# Patient Record
Sex: FEMALE | Race: WHITE | NOT HISPANIC OR LATINO | URBAN - METROPOLITAN AREA
[De-identification: names, ages, dates, MRNs, and addresses within clinical notes are randomized per-mention and may not be internally consistent; named-entity substitution may affect disease eponyms.]

---

## 2023-10-10 PROBLEM — E78.5 HYPERLIPIDEMIA: Status: ACTIVE | Noted: 2023-10-10

## 2023-10-10 PROBLEM — M54.42 LUMBAGO WITH SCIATICA, LEFT SIDE: Status: ACTIVE | Noted: 2023-10-10

## 2023-10-10 PROBLEM — E55.9 VITAMIN D DEFICIENCY: Status: ACTIVE | Noted: 2023-10-10

## 2023-10-10 PROBLEM — G89.29 OTHER CHRONIC PAIN: Status: ACTIVE | Noted: 2023-10-10

## 2023-10-10 PROBLEM — I10 ESSENTIAL HYPERTENSION: Status: ACTIVE | Noted: 2023-10-10

## 2023-10-11 DIAGNOSIS — E78.5 HYPERLIPIDEMIA, UNSPECIFIED HYPERLIPIDEMIA TYPE: ICD-10-CM

## 2023-10-11 NOTE — TELEPHONE ENCOUNTER
Rx request received. Please send populated medications to [facility] . Last appointment date 4/3/23 .

## 2023-10-12 RX ORDER — ATORVASTATIN CALCIUM 20 MG/1
20 TABLET, FILM COATED ORAL DAILY
Qty: 90 TABLET | Refills: 1 | Status: SHIPPED | OUTPATIENT
Start: 2023-10-12 | End: 2024-06-05 | Stop reason: WASHOUT

## 2023-11-18 DIAGNOSIS — G89.29 OTHER CHRONIC PAIN: ICD-10-CM

## 2023-11-20 RX ORDER — MELOXICAM 15 MG/1
15 TABLET ORAL DAILY
Qty: 90 TABLET | Refills: 0 | Status: SHIPPED | OUTPATIENT
Start: 2023-11-20 | End: 2024-03-07 | Stop reason: SDUPTHER

## 2024-03-07 DIAGNOSIS — I10 ESSENTIAL HYPERTENSION: ICD-10-CM

## 2024-03-07 DIAGNOSIS — G89.29 OTHER CHRONIC PAIN: ICD-10-CM

## 2024-03-07 RX ORDER — LOSARTAN POTASSIUM 50 MG/1
50 TABLET ORAL DAILY
COMMUNITY
Start: 2022-12-05 | End: 2024-03-07 | Stop reason: SDUPTHER

## 2024-03-07 RX ORDER — MELOXICAM 15 MG/1
15 TABLET ORAL DAILY
Qty: 90 TABLET | Refills: 0 | Status: SHIPPED | OUTPATIENT
Start: 2024-03-07 | End: 2024-06-05

## 2024-03-07 RX ORDER — LOSARTAN POTASSIUM 50 MG/1
50 TABLET ORAL DAILY
Qty: 90 TABLET | Refills: 3 | Status: SHIPPED | OUTPATIENT
Start: 2024-03-07 | End: 2025-03-07

## 2024-05-06 DIAGNOSIS — Z76.0 MEDICATION REFILL: ICD-10-CM

## 2024-05-07 RX ORDER — SERTRALINE HYDROCHLORIDE 50 MG/1
50 TABLET, FILM COATED ORAL DAILY
Qty: 30 TABLET | Refills: 0 | Status: SHIPPED | OUTPATIENT
Start: 2024-05-07 | End: 2024-06-05

## 2024-06-05 ENCOUNTER — OFFICE VISIT (OUTPATIENT)
Dept: PRIMARY CARE | Facility: CLINIC | Age: 64
End: 2024-06-05
Payer: COMMERCIAL

## 2024-06-05 VITALS
BODY MASS INDEX: 26.99 KG/M2 | HEIGHT: 65 IN | TEMPERATURE: 96.9 F | DIASTOLIC BLOOD PRESSURE: 80 MMHG | SYSTOLIC BLOOD PRESSURE: 122 MMHG | OXYGEN SATURATION: 97 % | WEIGHT: 162 LBS | HEART RATE: 73 BPM

## 2024-06-05 DIAGNOSIS — Z76.0 MEDICATION REFILL: ICD-10-CM

## 2024-06-05 DIAGNOSIS — E55.9 VITAMIN D DEFICIENCY: ICD-10-CM

## 2024-06-05 DIAGNOSIS — Z12.31 ENCOUNTER FOR SCREENING MAMMOGRAM FOR MALIGNANT NEOPLASM OF BREAST: ICD-10-CM

## 2024-06-05 DIAGNOSIS — Z12.11 SCREENING FOR MALIGNANT NEOPLASM OF COLON: ICD-10-CM

## 2024-06-05 DIAGNOSIS — F32.A ANXIETY AND DEPRESSION: ICD-10-CM

## 2024-06-05 DIAGNOSIS — G89.29 OTHER CHRONIC PAIN: ICD-10-CM

## 2024-06-05 DIAGNOSIS — E78.5 HYPERLIPIDEMIA, UNSPECIFIED HYPERLIPIDEMIA TYPE: ICD-10-CM

## 2024-06-05 DIAGNOSIS — Z87.891 PERSONAL HISTORY OF NICOTINE DEPENDENCE: ICD-10-CM

## 2024-06-05 DIAGNOSIS — Z00.00 ANNUAL PHYSICAL EXAM: Primary | ICD-10-CM

## 2024-06-05 DIAGNOSIS — M54.41 MIDLINE LOW BACK PAIN WITH BILATERAL SCIATICA, UNSPECIFIED CHRONICITY: ICD-10-CM

## 2024-06-05 DIAGNOSIS — Z71.85 VACCINE COUNSELING: ICD-10-CM

## 2024-06-05 DIAGNOSIS — M54.42 MIDLINE LOW BACK PAIN WITH BILATERAL SCIATICA, UNSPECIFIED CHRONICITY: ICD-10-CM

## 2024-06-05 DIAGNOSIS — I10 ESSENTIAL HYPERTENSION: ICD-10-CM

## 2024-06-05 DIAGNOSIS — Z13.6 SCREENING FOR CARDIOVASCULAR CONDITION: ICD-10-CM

## 2024-06-05 DIAGNOSIS — F41.9 ANXIETY AND DEPRESSION: ICD-10-CM

## 2024-06-05 PROCEDURE — 99214 OFFICE O/P EST MOD 30 MIN: CPT | Performed by: STUDENT IN AN ORGANIZED HEALTH CARE EDUCATION/TRAINING PROGRAM

## 2024-06-05 PROCEDURE — 99396 PREV VISIT EST AGE 40-64: CPT | Performed by: STUDENT IN AN ORGANIZED HEALTH CARE EDUCATION/TRAINING PROGRAM

## 2024-06-05 PROCEDURE — 3074F SYST BP LT 130 MM HG: CPT | Performed by: STUDENT IN AN ORGANIZED HEALTH CARE EDUCATION/TRAINING PROGRAM

## 2024-06-05 PROCEDURE — 3079F DIAST BP 80-89 MM HG: CPT | Performed by: STUDENT IN AN ORGANIZED HEALTH CARE EDUCATION/TRAINING PROGRAM

## 2024-06-05 RX ORDER — MELOXICAM 15 MG/1
15 TABLET ORAL DAILY
Qty: 90 TABLET | Refills: 0 | Status: SHIPPED | OUTPATIENT
Start: 2024-06-05

## 2024-06-05 RX ORDER — SERTRALINE HYDROCHLORIDE 50 MG/1
50 TABLET, FILM COATED ORAL DAILY
Qty: 30 TABLET | Refills: 0 | Status: SHIPPED | OUTPATIENT
Start: 2024-06-05

## 2024-06-05 RX ORDER — METHYLPREDNISOLONE 4 MG/1
TABLET ORAL
Qty: 21 TABLET | Refills: 0 | Status: SHIPPED | OUTPATIENT
Start: 2024-06-05 | End: 2024-06-12

## 2024-06-05 ASSESSMENT — ANXIETY QUESTIONNAIRES
1. FEELING NERVOUS, ANXIOUS, OR ON EDGE: NOT AT ALL
4. TROUBLE RELAXING: NOT AT ALL
IF YOU CHECKED OFF ANY PROBLEMS ON THIS QUESTIONNAIRE, HOW DIFFICULT HAVE THESE PROBLEMS MADE IT FOR YOU TO DO YOUR WORK, TAKE CARE OF THINGS AT HOME, OR GET ALONG WITH OTHER PEOPLE: NOT DIFFICULT AT ALL
7. FEELING AFRAID AS IF SOMETHING AWFUL MIGHT HAPPEN: NOT AT ALL
2. NOT BEING ABLE TO STOP OR CONTROL WORRYING: NOT AT ALL
6. BECOMING EASILY ANNOYED OR IRRITABLE: NOT AT ALL
GAD7 TOTAL SCORE: 0
5. BEING SO RESTLESS THAT IT IS HARD TO SIT STILL: NOT AT ALL
3. WORRYING TOO MUCH ABOUT DIFFERENT THINGS: NOT AT ALL

## 2024-06-05 ASSESSMENT — PATIENT HEALTH QUESTIONNAIRE - PHQ9
4. FEELING TIRED OR HAVING LITTLE ENERGY: NOT AT ALL
10. IF YOU CHECKED OFF ANY PROBLEMS, HOW DIFFICULT HAVE THESE PROBLEMS MADE IT FOR YOU TO DO YOUR WORK, TAKE CARE OF THINGS AT HOME, OR GET ALONG WITH OTHER PEOPLE: NOT DIFFICULT AT ALL
SUM OF ALL RESPONSES TO PHQ9 QUESTIONS 1 AND 2: 0
SUM OF ALL RESPONSES TO PHQ QUESTIONS 1-9: 1
2. FEELING DOWN, DEPRESSED OR HOPELESS: NOT AT ALL
8. MOVING OR SPEAKING SO SLOWLY THAT OTHER PEOPLE COULD HAVE NOTICED. OR THE OPPOSITE, BEING SO FIGETY OR RESTLESS THAT YOU HAVE BEEN MOVING AROUND A LOT MORE THAN USUAL: NOT AT ALL
7. TROUBLE CONCENTRATING ON THINGS, SUCH AS READING THE NEWSPAPER OR WATCHING TELEVISION: NOT AT ALL
5. POOR APPETITE OR OVEREATING: NOT AT ALL
9. THOUGHTS THAT YOU WOULD BE BETTER OFF DEAD, OR OF HURTING YOURSELF: NOT AT ALL
1. LITTLE INTEREST OR PLEASURE IN DOING THINGS: NOT AT ALL
6. FEELING BAD ABOUT YOURSELF - OR THAT YOU ARE A FAILURE OR HAVE LET YOURSELF OR YOUR FAMILY DOWN: NOT AT ALL
3. TROUBLE FALLING OR STAYING ASLEEP OR SLEEPING TOO MUCH: SEVERAL DAYS

## 2024-06-05 ASSESSMENT — PAIN SCALES - GENERAL: PAINLEVEL: 0-NO PAIN

## 2024-06-05 NOTE — PATIENT INSTRUCTIONS
Thank you for coming to see me today.    Medrol Dosepak sent to pharmacy for back pain/sciatica.    Go to the lab for fasting blood work, get this done 2 weeks after you complete the steroid course.    Mammogram and CT lung cancer screening ordered today, call to schedule.    GI referral entered for screening colonoscopy, call to schedule.    Follow-up with me in the coming weeks for Pap smear and tetanus (Tdap) vaccination, sooner if needed.

## 2024-06-05 NOTE — PROGRESS NOTES
Subjective   Alexia Gonzáles is a 63 y.o. female who presents for med review.    HPI:      PREVENTATIVE HEALTH CARE:    Immunizations:  COVID:  DUE  TDaP:  DUE - will defer until next appointment  Pneumonia:  utd  Shingles:  Had both shots in [place]    Immunization History   Administered Date(s) Administered    Flu vaccine, quadrivalent, no egg protein, age 6 month or greater (FLUCELVAX) 09/19/2020    Influenza, injectable, quadrivalent 01/26/2023    Pneumococcal conjugate vaccine, 20-valent (PREVNAR 20) 01/26/2023       Screenings:  HIV/HCV:  negative x2 4/3/2023 - utd  Pap:  DUE - will schedule another appointment  Mammogram:  DUE  Colonoscopy:  DUE - Referral given to Dr. Voss at last physical in 1/2023.  Lung:  DUE -ordered last year but never performed.  1ppd since age 16    Hyperlipidemia:  No longer taking atorvastatin, made her constipated    Back/leg Pain  Pain down both hips and all the way down both legs.  Has used steroid in the past.  Taking meloxicam regularly    Depression:  Sertraline 50 mg daily.  Patient Health Questionnaire-9 Score: 1      Not at all Not at all    Not being able to stop or control worrying Not at all Not at all   Worrying too much about different things Not at all Not at all   Trouble relaxing Not at all Not at all   Being so restless that it is hard to sit still Not at all Not at all   Becoming easily annoyed or irritable Not at all Not at all   Feeling afraid as if something awful might happen Not at all Not at all   DOROTHEA-7 Total Score 0 0   If you checked off any problems on this questionnaire,      How difficult have these problems made it for you to do your work, take care of things at home, or get along with other people? Not difficult at all        Hypertension:  Taking losartan 50 mg daily.  No chest pain/pressure/palpitations.  Also without headache or vision changes.        ROS:    Review of systems is essentially negative for all systems except for any identified issues in  "HPI above.    Objective     /80   Pulse 73   Temp 36.1 °C (96.9 °F)   Ht 1.651 m (5' 5\")   Wt 73.5 kg (162 lb)   SpO2 97%   BMI 26.96 kg/m²      PHYSICAL EXAM    GENERAL  Well-appearing, pleasant and cooperative.  No acute distress.    HEENT  HEAD:   Normocephalic.  Atraumatic.  EYES:  PERRLA.  No scleral icterus or conjunctival injection.  EARS:  Tympanic membranes visualized bilaterally without erythema, fluid, or bulging.  NECK:  No adenopathy.  No palpable thyroid enlargement or nodules.    THROAT:  Moist oropharynx without tonsillar enlargement or exudates.    LUNGS:    Clear to auscultation bilaterally.  No wheezes, rales, rhonchi.    CARDIAC:  Regular rate and rhythm.  Normal S1S2.  No murmurs/rubs/gallops.    ABDOMEN:  Soft, non-tender, non-distended.  No hepatosplenomegaly.  Normoactive bowel sounds.    MUSCULOSKELETAL:  No gross abnormalities.   No joint swelling or erythema,.  No spinal or paraspinal tenderness to palpation.    EXTREMITIES:  No LE edema or cyanosis.      NEURO           Alert and oriented x3. No focal deficits.    PSYCH:          Affect appropriate.           Assessment/Plan   Problem List Items Addressed This Visit       Essential hypertension     Well controlled, continue losartan daily.         Hyperlipidemia     Stopped taking atorvastatin.  Agreeable to recheck lipid panel with other routine labs, consider reinitiation of treatment if needed.         Vitamin D deficiency    Relevant Orders    Vitamin D 25-Hydroxy,Total (for eval of Vitamin D levels)     Other Visit Diagnoses       Annual physical exam    -  Primary    Relevant Orders    Lipid Panel    TSH with reflex to Free T4 if abnormal    Comprehensive Metabolic Panel    CBC    Screening for cardiovascular condition        Relevant Orders    Lipid Panel    Encounter for screening mammogram for malignant neoplasm of breast        Relevant Orders    BI mammo bilateral screening    Personal history of nicotine dependence "        Relevant Orders    CT lung screening low dose    Midline low back pain with bilateral sciatica, unspecified chronicity        Relevant Medications    methylPREDNISolone (Medrol Dospak) 4 mg tablets    Vaccine counseling        Screening for malignant neoplasm of colon        Relevant Orders    Referral to Gastroenterology    Anxiety and depression        Well controlled, continue sertraline daily as prescribed.        Counseling:   Medication education:    Education: The patient is counseled regarding potential side effects of all new medications.    Understanding:  Patient expressed understanding.    Adherence:  No barriers to adherence identified.           Natasha Sigala MD

## 2024-06-06 NOTE — ASSESSMENT & PLAN NOTE
Stopped taking atorvastatin.  Agreeable to recheck lipid panel with other routine labs, consider reinitiation of treatment if needed.

## 2024-07-05 ENCOUNTER — TELEPHONE (OUTPATIENT)
Dept: PRIMARY CARE | Facility: CLINIC | Age: 64
End: 2024-07-05
Payer: COMMERCIAL

## 2024-07-05 DIAGNOSIS — M54.42 MIDLINE LOW BACK PAIN WITH BILATERAL SCIATICA, UNSPECIFIED CHRONICITY: Primary | ICD-10-CM

## 2024-07-05 DIAGNOSIS — M54.41 MIDLINE LOW BACK PAIN WITH BILATERAL SCIATICA, UNSPECIFIED CHRONICITY: Primary | ICD-10-CM

## 2024-07-05 NOTE — TELEPHONE ENCOUNTER
Pt lvm requesting a referral to neurology for her sciatic pain pt states the steroids are not helping anymore

## 2024-07-11 DIAGNOSIS — Z76.0 MEDICATION REFILL: ICD-10-CM

## 2024-07-11 RX ORDER — SERTRALINE HYDROCHLORIDE 50 MG/1
50 TABLET, FILM COATED ORAL DAILY
Qty: 90 TABLET | Refills: 1 | Status: SHIPPED | OUTPATIENT
Start: 2024-07-11

## 2024-08-23 ENCOUNTER — APPOINTMENT (OUTPATIENT)
Dept: GASTROENTEROLOGY | Facility: CLINIC | Age: 64
End: 2024-08-23
Payer: COMMERCIAL

## 2024-08-27 ENCOUNTER — OFFICE VISIT (OUTPATIENT)
Dept: NEUROLOGY | Facility: CLINIC | Age: 64
End: 2024-08-27
Payer: COMMERCIAL

## 2024-08-27 VITALS — BODY MASS INDEX: 26.82 KG/M2 | WEIGHT: 161 LBS | HEIGHT: 65 IN

## 2024-08-27 DIAGNOSIS — M54.42 MIDLINE LOW BACK PAIN WITH BILATERAL SCIATICA, UNSPECIFIED CHRONICITY: ICD-10-CM

## 2024-08-27 DIAGNOSIS — M54.16 LUMBAR RADICULOPATHY: Primary | ICD-10-CM

## 2024-08-27 DIAGNOSIS — M54.41 MIDLINE LOW BACK PAIN WITH BILATERAL SCIATICA, UNSPECIFIED CHRONICITY: ICD-10-CM

## 2024-08-27 RX ORDER — GABAPENTIN 300 MG/1
300 CAPSULE ORAL 3 TIMES DAILY
Qty: 90 CAPSULE | Refills: 11 | Status: SHIPPED | OUTPATIENT
Start: 2024-08-27 | End: 2025-08-27

## 2024-08-27 NOTE — PATIENT INSTRUCTIONS
Call [phone] to schedule your MRI.      We will call to schedule your EMG test.       You can reach us at our office phone: [phone].     Eugene Coates MD  Neurology and Neuromuscular Medicine   [facility]  [facility]   [address]  [address] [place] [address]

## 2024-08-27 NOTE — PROGRESS NOTES
Neurology/Neuromuscular Consult     Alexia Gonzáles, MRN: [id number], : 1960  Reason for Referral: Sciatica   referring Provider: Natasha Sigala MD  Primary Care Physician: Natasha Sigala MD     History of Present Illness:    Ms. Gonzáles is a 64 y.o. female who presents for evaluation of sciatica.     For a year she has lower buttock pain shooting into calves.  Her symptoms are worse on the right compared to the left. They are occurring on a daily basis.  She is able to sit which relieves her symptoms.  Prolonged movement or walking makes her symptoms worse.  Tried medrol packs, PT, and chiro which made it worse.      There is a lot of crampy pain in the legs constant.     No numbness and tinlging in the feet.     Walking hunched forward feels better.    No bladder or bowel problems.     Weight gain 20 lb in 2 years.       Past Medical/Surgical History Reviewed:  HTN, HLD    Relevant past medical, surgical, family, and social histories, along with ROS was reviewed and pertinent details noted above.     Past Medical History:   Diagnosis Date    Depression      No past surgical history on file.  Family History   Problem Relation Name Age of Onset    Blood clot Mother      Cancer Father       Social History     Tobacco Use    Smoking status: Every Day     Current packs/day: 1.00     Types: Cigarettes    Smokeless tobacco: Never   Substance Use Topics    Alcohol use: Not on file      No Known Allergies     Medications:    Current Outpatient Medications:     losartan (Cozaar) 50 mg tablet, Take 1 tablet (50 mg) by mouth once daily., Disp: 90 tablet, Rfl: 3    sertraline (Zoloft) 50 mg tablet, TAKE ONE TABLET BY MOUTH EVERY DAY, Disp: 90 tablet, Rfl: 1    gabapentin (Neurontin) 300 mg capsule, Take 1 capsule (300 mg) by mouth 3 times a day., Disp: 90 capsule, Rfl: 11    meloxicam (Mobic) 15 mg tablet, TAKE ONE TABLET BY MOUTH ONCE DAILY (Patient not taking: Reported on 2024), Disp: 90 tablet, Rfl: 0  "      Physical Exam:   Ht 1.651 m (5' 5\")   Wt 73 kg (161 lb)   BMI 26.79 kg/m²      Neurological Exam:  General: NAD, cooperative   Neuro:  Awake alert, language normal, no dysarthria    Visual fields full  EOM full range  Smile sym  Tongue midline   Strength 5/5 throughout   Tone and bulk normal   Reflexes:      R          L  BR:               2          2  Biceps:         2          2  Triceps:        2          2  Knee:           2          2  Ankle:          0          0  Toes down  Sensory normal  Gait: normal base and stride, tandem and Romberg normal    Straight leg raise is negative bilaterally  There is slight discomfort to Jac's test bilaterally.    Results:     The following results, labs, and/or imaging were personally reviewed and are remarkable for:    The results below are reports from [facility] imaging:    MRI of the pelvis with and without contrast done at the [facility] on 9/2023 reported \"BENIGN FIBROOSSEUS LESION ON THE ILIAC SIDE OF THE RIGHT SI JOINT   CORRESPONDING WITH THE CT ABNORMALITY.  A SECOND SIMILAR BENIGN LESION IS   PRESENT IN THE LEFT INTERTROCHANTERIC FEMUR, BOTH UNCHANGED FROM THE   COMPARISON CT\"       CT Lumbar Spine 7/18/2023:  No acute fracture or acute traumatic subluxation of the     Multilevel discogenic degenerative changes and posterior facet   degenerative changes present involving the mid and lower lumbar.     Diffuse disc bulge and ligamentum flavum hypertrophy present at L3-4   causing moderate central canal stenosis and bilateral neural foraminal   narrowing.     Diffuse broad-based disc bulge at L4-5 with posterior facet degenerative   change cause severe central canal stenosis and multilevel neural   foraminal narrowing.     CT Pelvis   Nonspecific groundglass sclerotic density associated with the right ilium   medially on 7:115. This could be further evaluated/characterized with MRI   of the pelvis.       Impression/Plan:   Alexia Gonzáles " presents with typical right slightly worse than left radicular symptoms, with some features of neuro claudication.  Her symptoms are constant and she has severe back pain.  Exam is fairly unremarkable.  Will workup with EMG and MRI of lumbar spine.  Gabapentin as ordered for symptom relief.    Plan:  Problem List Items Addressed This Visit       Lumbar radiculopathy - Primary    Relevant Medications    gabapentin (Neurontin) 300 mg capsule up to TID     Other Relevant Orders    EMG & nerve conduction    MR lumbar spine wo IV contrast    Referral to Physical Therapy     Eugene Coates MD  Neurology and Neuromuscular Medicine   [facility]  [facility] [place]          The total appointment time today was 45 minutes. This time included preparing to see the patient, obtaining the history, performing a medically necessary appropriate physical examination, counseling and educating the patient/family, ordering tests, referring and communicating with other providers, independently interpreting results  to the patient/family and documenting clinical information in the medical record.

## 2024-09-13 ENCOUNTER — PROCEDURE VISIT (OUTPATIENT)
Dept: PRIMARY CARE | Facility: CLINIC | Age: 64
End: 2024-09-13
Payer: COMMERCIAL

## 2024-09-13 ENCOUNTER — HOSPITAL ENCOUNTER (OUTPATIENT)
Dept: RADIOLOGY | Facility: HOSPITAL | Age: 64
Discharge: HOME | End: 2024-09-13
Payer: COMMERCIAL

## 2024-09-13 VITALS
OXYGEN SATURATION: 97 % | TEMPERATURE: 96.9 F | HEART RATE: 72 BPM | BODY MASS INDEX: 27.82 KG/M2 | SYSTOLIC BLOOD PRESSURE: 120 MMHG | HEIGHT: 65 IN | WEIGHT: 167 LBS | DIASTOLIC BLOOD PRESSURE: 82 MMHG

## 2024-09-13 DIAGNOSIS — I10 ESSENTIAL HYPERTENSION: ICD-10-CM

## 2024-09-13 DIAGNOSIS — M54.16 LUMBAR RADICULOPATHY: ICD-10-CM

## 2024-09-13 DIAGNOSIS — Z12.4 CERVICAL CANCER SCREENING: Primary | ICD-10-CM

## 2024-09-13 DIAGNOSIS — Z28.21 INFLUENZA VACCINATION DECLINED BY PATIENT: ICD-10-CM

## 2024-09-13 PROCEDURE — 99214 OFFICE O/P EST MOD 30 MIN: CPT | Performed by: STUDENT IN AN ORGANIZED HEALTH CARE EDUCATION/TRAINING PROGRAM

## 2024-09-13 PROCEDURE — 72148 MRI LUMBAR SPINE W/O DYE: CPT

## 2024-09-13 ASSESSMENT — PATIENT HEALTH QUESTIONNAIRE - PHQ9
1. LITTLE INTEREST OR PLEASURE IN DOING THINGS: NOT AT ALL
2. FEELING DOWN, DEPRESSED OR HOPELESS: NOT AT ALL
SUM OF ALL RESPONSES TO PHQ9 QUESTIONS 1 AND 2: 0

## 2024-09-13 ASSESSMENT — PAIN SCALES - GENERAL: PAINLEVEL: 0-NO PAIN

## 2024-09-13 NOTE — PATIENT INSTRUCTIONS
Thank you for coming to see me today.    We will call you with results of Pap smear performed in clinic today.    Routine follow-up/medication review in 6 months, sooner if needed.

## 2024-09-13 NOTE — PROGRESS NOTES
"Subjective   Alexia Gonzáles is a 64 y.o. female who presents for pap.    HPI:      This is a 64-year-old female presenting for Pap smear.  Last seen by me for yearly physical on 6/5/2024.    Cervical Cancer Screening:  Has been several years  Menopause at age 50. No bleeding/spotting.      Hypertension:  Managed with losartan 50 mg daily.  Patient reports compliance with all blood pressure medications as prescribed.  Denies symptoms of chest pain/pressure/palpitations.  Also without headache or vision changes.      ROS:   Review of systems is essentially negative for all systems except for any identified issues in HPI above.    Objective     /82   Pulse 72   Temp 36.1 °C (96.9 °F)   Ht 1.651 m (5' 5\")   Wt 75.8 kg (167 lb)   SpO2 97%   BMI 27.79 kg/m²      PHYSICAL EXAM    GENERAL  Well-appearing, pleasant and cooperative.  No acute distress.    HEENT  HEAD:   Normocephalic.  Atraumatic.  EYES:  PERRLA.  No scleral icterus or conjunctival injection.  EARS:  Tympanic membranes visualized bilaterally without erythema, fluid, or bulging.  NECK:  No adenopathy.  No palpable thyroid enlargement or nodules.    THROAT:  Moist oropharynx without tonsillar enlargement or exudates.    LUNGS:    Clear to auscultation bilaterally.  No wheezes, rales, rhonchi.    CARDIAC:  Regular rate and rhythm.  Normal S1S2.  No murmurs/rubs/gallops.    ABDOMEN:  Soft, non-tender, non-distended.  No hepatosplenomegaly.  Normoactive bowel sounds.    :  Normal external genitalia.  Moist vaginal mucosa.  Cervix visualized without lesions, no significant discharge from external cervical os.  Bimanual exam with mobile uterus, no adenexal masses palpable bilaterally.    MUSCULOSKELETAL:  No gross abnormalities.   No joint swelling or erythema,.  No spinal or paraspinal tenderness to palpation.    EXTREMITIES:  No LE edema or cyanosis.      NEURO           Alert and oriented x3. No focal deficits.    PSYCH:          Affect " appropriate.           Assessment/Plan   Problem List Items Addressed This Visit       Essential hypertension     Well controlled, asymptomatic.  Continue losartan at current dose.          Other Visit Diagnoses       Cervical cancer screening    -  Primary    Relevant Orders    THINPREP PAP TEST    Influenza vaccination declined by patient                   Natasha Sigala MD

## 2024-09-16 DIAGNOSIS — M48.062 SPINAL STENOSIS OF LUMBAR REGION WITH NEUROGENIC CLAUDICATION: Primary | ICD-10-CM

## 2024-09-16 NOTE — PROGRESS NOTES
I called to review her MRI of the L spine which shows significant severe spinal stenosis with compression of the nerve roots. Her symptoms have been progressively worsening for a year. We will pursue pain medicine for injections for pain, and a referral to spine surgery to see if she is a surgical candidate.  I told her to make the appointment with me for an EMG to further localize.

## 2024-09-25 ENCOUNTER — APPOINTMENT (OUTPATIENT)
Dept: PHYSICAL THERAPY | Facility: CLINIC | Age: 64
End: 2024-09-25
Payer: COMMERCIAL

## 2024-09-30 ENCOUNTER — OFFICE VISIT (OUTPATIENT)
Dept: PAIN MEDICINE | Facility: CLINIC | Age: 64
End: 2024-09-30
Payer: COMMERCIAL

## 2024-09-30 ENCOUNTER — PREP FOR PROCEDURE (OUTPATIENT)
Dept: PAIN MEDICINE | Facility: CLINIC | Age: 64
End: 2024-09-30
Payer: COMMERCIAL

## 2024-09-30 VITALS
DIASTOLIC BLOOD PRESSURE: 78 MMHG | WEIGHT: 160 LBS | HEART RATE: 68 BPM | RESPIRATION RATE: 22 BRPM | BODY MASS INDEX: 26.66 KG/M2 | OXYGEN SATURATION: 98 % | HEIGHT: 65 IN | SYSTOLIC BLOOD PRESSURE: 132 MMHG

## 2024-09-30 DIAGNOSIS — M54.16 LUMBAR RADICULOPATHY: Primary | ICD-10-CM

## 2024-09-30 DIAGNOSIS — M48.062 SPINAL STENOSIS OF LUMBAR REGION WITH NEUROGENIC CLAUDICATION: ICD-10-CM

## 2024-09-30 DIAGNOSIS — M48.062 LUMBAR STENOSIS WITH NEUROGENIC CLAUDICATION: ICD-10-CM

## 2024-09-30 DIAGNOSIS — M54.12 CERVICAL RADICULOPATHY: ICD-10-CM

## 2024-09-30 PROCEDURE — 3078F DIAST BP <80 MM HG: CPT | Performed by: ANESTHESIOLOGY

## 2024-09-30 PROCEDURE — 99406 BEHAV CHNG SMOKING 3-10 MIN: CPT | Performed by: ANESTHESIOLOGY

## 2024-09-30 PROCEDURE — 99214 OFFICE O/P EST MOD 30 MIN: CPT | Performed by: ANESTHESIOLOGY

## 2024-09-30 PROCEDURE — 3008F BODY MASS INDEX DOCD: CPT | Performed by: ANESTHESIOLOGY

## 2024-09-30 PROCEDURE — 3075F SYST BP GE 130 - 139MM HG: CPT | Performed by: ANESTHESIOLOGY

## 2024-09-30 PROCEDURE — 99204 OFFICE O/P NEW MOD 45 MIN: CPT | Performed by: ANESTHESIOLOGY

## 2024-09-30 ASSESSMENT — PAIN SCALES - GENERAL
PAINLEVEL_OUTOF10: 7
PAINLEVEL: 7

## 2024-09-30 ASSESSMENT — PATIENT HEALTH QUESTIONNAIRE - PHQ9
2. FEELING DOWN, DEPRESSED OR HOPELESS: NOT AT ALL
SUM OF ALL RESPONSES TO PHQ9 QUESTIONS 1 & 2: 0
1. LITTLE INTEREST OR PLEASURE IN DOING THINGS: NOT AT ALL

## 2024-09-30 ASSESSMENT — ENCOUNTER SYMPTOMS
BACK PAIN: 1
ACTIVITY CHANGE: 1

## 2024-09-30 ASSESSMENT — PAIN - FUNCTIONAL ASSESSMENT: PAIN_FUNCTIONAL_ASSESSMENT: 0-10

## 2024-09-30 ASSESSMENT — LIFESTYLE VARIABLES
HOW OFTEN DO YOU HAVE A DRINK CONTAINING ALCOHOL: MONTHLY OR LESS
HOW OFTEN DO YOU HAVE SIX OR MORE DRINKS ON ONE OCCASION: NEVER
AUDIT-C TOTAL SCORE: 1
HOW MANY STANDARD DRINKS CONTAINING ALCOHOL DO YOU HAVE ON A TYPICAL DAY: 1 OR 2
SKIP TO QUESTIONS 9-10: 1

## 2024-09-30 ASSESSMENT — PAIN DESCRIPTION - DESCRIPTORS: DESCRIPTORS: ACHING;CRAMPING;SHARP

## 2024-09-30 NOTE — PROGRESS NOTES
The patient is a 64-year-old female with low back and bilateral leg pain.  The pain is constant but worse when she is standing and walking.  She gets some relief with rest.  She has had this pain for approximately 1 year.  The pain is worsening.  Her quality of life is unacceptable.  The pain radiates down the posterolateral aspect of both legs to a point below the knees.  She has participated in physical therapy.  She has taken over-the-counter analgesics.  We reviewed the MRI of the lumbar spine which shows multilevel degenerative changes with severe stenosis at the L4-L5 level.  The patient is scheduled to see Dr. Gallegos in November.    Review of Systems   Constitutional:  Positive for activity change.   Musculoskeletal:  Positive for back pain and gait problem.   All other systems reviewed and are negative.    GENERAL: alert and appropriate, in mild acute distress, well-hydrated, well nourished, interactive         SKIN: no rash noted         HEAD: normocephalic, no abnormality or lesion noted         EYES: no injection and visual acuity is grossly normal         EARS: external ears normal, no mastoid tenderness         NOSE: external nose normal without rhinorrhea         OROPHARYNX: moist mucus membranes, no tonsillar hypertrophy/exudate, uvula midline and pharynx non-erythematous, lips, teeth and gums are without obvious lesion         NECK: Adequate ROM, no cervical LNs noted         RESPIRATORY: breathing non-labored and no grunting/flaring/retractions         CHEST: equal chest rise with normal respiratory effort         ABDOMEN: soft and non-tender         BACK: back normal in appearance, lumbar spine with reduced ROM         EXTREMITIES: strength intact         NEUROLOGIC: gait antalgic, SLR positive, sensation grossly intact    Assessment and Plan    -Chronicity--chronic spinal pain    -Diagnostics--we reviewed the lumbar spine MRI    -Pharmacologic--no change    -Psychologic--no need for psychologic  intervention from my standpoint.  There are no mental health issues of which I am aware that are contributing to the patient's pain.  There are no substance abuse or alcohol abuse issues of which I am aware that are contributing to the patient's pain.    -Physical--we discussed the importance of physical therapy and exercise.  We discussed avoidance and modification techniques.    -Intervention--I encouraged the patient to see Dr. Gallegos next month as scheduled.  The patient and I discussed the utility of an epidural steroid injection.  I placed an order for a bilateral L5 transforaminal epidural steroid injection in the event that the patient would like to pursue this therapy.    I spent time educating the patient on the condition including the treatment and the prognosis.  I invited the patient to call at anytime with any questions.    The patient smokes 1 ppd. I encouraged and counseled on smoking cessation as this may increase systemic inflammatory factors which may contribute to patient's chronic pain in addition to smoking as generalized health detriments.

## 2024-10-17 ENCOUNTER — HOSPITAL ENCOUNTER (OUTPATIENT)
Dept: RADIOLOGY | Facility: HOSPITAL | Age: 64
Discharge: HOME | End: 2024-10-17
Payer: COMMERCIAL

## 2024-10-17 DIAGNOSIS — M54.12 CERVICAL RADICULOPATHY: ICD-10-CM

## 2024-10-17 PROCEDURE — 72141 MRI NECK SPINE W/O DYE: CPT | Performed by: RADIOLOGY

## 2024-10-17 PROCEDURE — 72141 MRI NECK SPINE W/O DYE: CPT

## 2024-11-20 ENCOUNTER — APPOINTMENT (OUTPATIENT)
Dept: ORTHOPEDIC SURGERY | Facility: CLINIC | Age: 64
End: 2024-11-20
Payer: COMMERCIAL

## 2024-11-20 ENCOUNTER — HOSPITAL ENCOUNTER (OUTPATIENT)
Dept: RADIOLOGY | Facility: CLINIC | Age: 64
Discharge: HOME | End: 2024-11-20
Payer: COMMERCIAL

## 2024-11-20 DIAGNOSIS — M54.16 LUMBAR RADICULOPATHY: ICD-10-CM

## 2024-11-20 DIAGNOSIS — M48.062 SPINAL STENOSIS OF LUMBAR REGION WITH NEUROGENIC CLAUDICATION: ICD-10-CM

## 2024-11-20 PROCEDURE — 72110 X-RAY EXAM L-2 SPINE 4/>VWS: CPT | Performed by: RADIOLOGY

## 2024-11-20 PROCEDURE — 72110 X-RAY EXAM L-2 SPINE 4/>VWS: CPT

## 2024-11-20 PROCEDURE — 99204 OFFICE O/P NEW MOD 45 MIN: CPT | Performed by: ORTHOPAEDIC SURGERY

## 2024-11-20 PROCEDURE — 99214 OFFICE O/P EST MOD 30 MIN: CPT | Performed by: ORTHOPAEDIC SURGERY

## 2024-11-20 ASSESSMENT — PAIN - FUNCTIONAL ASSESSMENT: PAIN_FUNCTIONAL_ASSESSMENT: 0-10

## 2024-11-20 NOTE — LETTER
November 22, 2024     Natasha Sigala MD  [address] [facility]  [facility] [address]    Patient: Alexia Gonzáles   YOB: 1960   Date of Visit: 11/20/2024       Dear Dr. Natasha Sigala MD:    Thank you for referring Alexia Gonzáles to me for evaluation. Below are my notes for this consultation.  If you have questions, please do not hesitate to call me. I look forward to following your patient along with you.       Sincerely,     Kwaku Gallegos MD      CC: Eugene Coates MD  ______________________________________________________________________________________    This pleasant 64-year-old woman is referred by Dr. Eugene Coates.    She has developed rather severe back and bilateral leg pain with ambulation.  Her symptoms are consistent with neurogenic claudication.    Her ability to ambulate has become quite compromised.    She has been through a physical therapy program.    Family, social, and medical histories are obtained and reviewed.    30-point, patient-recorded Review of Systems is personally obtained and reviewed. Inclusive is no history of weight loss, change in appetite, recent change in activity level, change in bowel or bladder habits, fevers, chills, malaise, or night pain.    On exam, middle-age woman no acute distress.  Kyphotic posture.  Slow deliberate gait.  Her strength is intact in both upper extremities without pathologic reflexes.    She does have weakness in ankle dorsiflexion bilateral, 3/5 on the left, 4/5 on the right.  No hyperreflexia.    Painless motion both hips.    Her cervical MRI shows mild spondylitic changes without spinal cord compression.    Plain films today of the lumbar spine show degenerative spondylolisthesis of L4 on 5.    Her MRI shows extremely severe stenosis at L3-4 and L4-5.    Impression: This woman has become disabled with severe lumbar radiculopathy and claudication.  She has great difficulty with ambulation.   She has objective and subjective weakness.    She is a candidate for surgery given the severity of her symptoms, her weakness, and the severity of her neural compression on imaging.  Surgery in her case would be a posterior decompression and fusion L3-5 with a L4-5 TLIF.  We have discussed the risks and benefits and expectations of surgery.    Further physical therapy I think would be of limited benefit and epidural injections would be very unlikely to help her, given the severity of her stenosis.    I have encouraged her to think about things and she will let us know if she would like to proceed.    ** Dictated with voice recognition software and not immediately reviewed for errors in grammar and/or spelling **

## 2024-11-21 ENCOUNTER — TELEPHONE (OUTPATIENT)
Dept: NEUROLOGY | Facility: CLINIC | Age: 64
End: 2024-11-21
Payer: COMMERCIAL

## 2024-11-21 NOTE — TELEPHONE ENCOUNTER
Spoke with Dr. Coates and he declined the extra refill of Gabapentin at this time.  Earliest fill date will be 11/25/24 per [facility].

## 2024-11-22 NOTE — PROGRESS NOTES
This pleasant 64-year-old woman is referred by Dr. Eugene Coates.    She has developed rather severe back and bilateral leg pain with ambulation.  Her symptoms are consistent with neurogenic claudication.    Her ability to ambulate has become quite compromised.    She has been through a physical therapy program.    Family, social, and medical histories are obtained and reviewed.    30-point, patient-recorded Review of Systems is personally obtained and reviewed. Inclusive is no history of weight loss, change in appetite, recent change in activity level, change in bowel or bladder habits, fevers, chills, malaise, or night pain.    On exam, middle-age woman no acute distress.  Kyphotic posture.  Slow deliberate gait.  Her strength is intact in both upper extremities without pathologic reflexes.    She does have weakness in ankle dorsiflexion bilateral, 3/5 on the left, 4/5 on the right.  No hyperreflexia.    Painless motion both hips.    Her cervical MRI shows mild spondylitic changes without spinal cord compression.    Plain films today of the lumbar spine show degenerative spondylolisthesis of L4 on 5.    Her MRI shows extremely severe stenosis at L3-4 and L4-5.    Impression: This woman has become disabled with severe lumbar radiculopathy and claudication.  She has great difficulty with ambulation.  She has objective and subjective weakness.    She is a candidate for surgery given the severity of her symptoms, her weakness, and the severity of her neural compression on imaging.  Surgery in her case would be a posterior decompression and fusion L3-5 with a L4-5 TLIF.  We have discussed the risks and benefits and expectations of surgery.    Further physical therapy I think would be of limited benefit and epidural injections would be very unlikely to help her, given the severity of her stenosis.    I have encouraged her to think about things and she will let us know if she would like to proceed.    ** Dictated with  voice recognition software and not immediately reviewed for errors in grammar and/or spelling **

## 2025-01-04 ENCOUNTER — APPOINTMENT (OUTPATIENT)
Dept: RADIOLOGY | Facility: HOSPITAL | Age: 65
End: 2025-01-04
Payer: COMMERCIAL

## 2025-01-04 ENCOUNTER — HOSPITAL ENCOUNTER (EMERGENCY)
Facility: HOSPITAL | Age: 65
Discharge: HOME | End: 2025-01-04
Attending: EMERGENCY MEDICINE
Payer: COMMERCIAL

## 2025-01-04 VITALS
RESPIRATION RATE: 16 BRPM | OXYGEN SATURATION: 97 % | BODY MASS INDEX: 26.66 KG/M2 | DIASTOLIC BLOOD PRESSURE: 72 MMHG | TEMPERATURE: 96.8 F | SYSTOLIC BLOOD PRESSURE: 152 MMHG | HEART RATE: 62 BPM | HEIGHT: 65 IN | WEIGHT: 160 LBS

## 2025-01-04 DIAGNOSIS — T14.8XXA CONTUSION OF SOFT TISSUE: ICD-10-CM

## 2025-01-04 DIAGNOSIS — R10.9 FLANK PAIN: ICD-10-CM
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
## 2025-01-04 PROCEDURE — 99284 EMERGENCY DEPT VISIT MOD MDM: CPT | Mod: 25 | Performed by: EMERGENCY MEDICINE

## 2025-01-04 PROCEDURE — 74176 CT ABD & PELVIS W/O CONTRAST: CPT | Performed by: STUDENT IN AN ORGANIZED HEALTH CARE EDUCATION/TRAINING PROGRAM

## 2025-01-04 PROCEDURE — 74176 CT ABD & PELVIS W/O CONTRAST: CPT

## 2025-01-04 PROCEDURE — 2500000004 HC RX 250 GENERAL PHARMACY W/ HCPCS (ALT 636 FOR OP/ED): Performed by: EMERGENCY MEDICINE

## 2025-01-04 PROCEDURE — 96372 THER/PROPH/DIAG INJ SC/IM: CPT | Performed by: EMERGENCY MEDICINE

## 2025-01-04 RX ORDER — FENTANYL CITRATE 50 UG/ML
100 INJECTION, SOLUTION INTRAMUSCULAR; INTRAVENOUS ONCE
Status: COMPLETED | OUTPATIENT
Start: 2025-01-04 | End: 2025-01-04

## 2025-01-04 RX ORDER — ONDANSETRON 4 MG/1
4 TABLET, ORALLY DISINTEGRATING ORAL ONCE
Status: COMPLETED | OUTPATIENT
Start: 2025-01-04 | End: 2025-01-04

## 2025-01-04 RX ORDER — MORPHINE SULFATE 4 MG/ML
4 INJECTION, SOLUTION INTRAMUSCULAR; INTRAVENOUS ONCE
Status: COMPLETED | OUTPATIENT
Start: 2025-01-04 | End: 2025-01-04

## 2025-01-04 RX ORDER — OXYCODONE AND ACETAMINOPHEN 5; 325 MG/1; MG/1
1 TABLET ORAL EVERY 6 HOURS PRN
Qty: 10 TABLET | Refills: 0 | Status: SHIPPED | OUTPATIENT
Start: 2025-01-04 | End: 2025-01-08

## 2025-01-04 RX ORDER — OXYCODONE AND ACETAMINOPHEN 5; 325 MG/1; MG/1
1 TABLET ORAL EVERY 6 HOURS PRN
Qty: 10 TABLET | Refills: 0 | Status: SHIPPED | OUTPATIENT
Start: 2025-01-04 | End: 2025-01-04

## 2025-01-04 RX ADMIN — FENTANYL CITRATE 100 MCG: 50 INJECTION INTRAMUSCULAR; INTRAVENOUS at 21:48

## 2025-01-04 RX ADMIN — ONDANSETRON 4 MG: 4 TABLET, ORALLY DISINTEGRATING ORAL at 19:35

## 2025-01-04 RX ADMIN — MORPHINE SULFATE 4 MG: 4 INJECTION, SOLUTION INTRAMUSCULAR; INTRAVENOUS at 19:34

## 2025-01-04 ASSESSMENT — COLUMBIA-SUICIDE SEVERITY RATING SCALE - C-SSRS
1. IN THE PAST MONTH, HAVE YOU WISHED YOU WERE DEAD OR WISHED YOU COULD GO TO SLEEP AND NOT WAKE UP?: NO
6. HAVE YOU EVER DONE ANYTHING, STARTED TO DO ANYTHING, OR PREPARED TO DO ANYTHING TO END YOUR LIFE?: NO
2. HAVE YOU ACTUALLY HAD ANY THOUGHTS OF KILLING YOURSELF?: NO

## 2025-01-04 ASSESSMENT — PAIN - FUNCTIONAL ASSESSMENT
PAIN_FUNCTIONAL_ASSESSMENT: 0-10
PAIN_FUNCTIONAL_ASSESSMENT: 0-10

## 2025-01-04 ASSESSMENT — PAIN SCALES - GENERAL
PAINLEVEL_OUTOF10: 10 - WORST POSSIBLE PAIN

## 2025-01-04 ASSESSMENT — LIFESTYLE VARIABLES
HAVE YOU EVER FELT YOU SHOULD CUT DOWN ON YOUR DRINKING: NO
TOTAL SCORE: 0
EVER FELT BAD OR GUILTY ABOUT YOUR DRINKING: NO
HAVE PEOPLE ANNOYED YOU BY CRITICIZING YOUR DRINKING: NO
EVER HAD A DRINK FIRST THING IN THE MORNING TO STEADY YOUR NERVES TO GET RID OF A HANGOVER: NO

## 2025-01-04 ASSESSMENT — PAIN DESCRIPTION - LOCATION
LOCATION: ABDOMEN

## 2025-01-04 ASSESSMENT — PAIN DESCRIPTION - ORIENTATION
ORIENTATION: RIGHT
ORIENTATION: RIGHT

## 2025-01-04 ASSESSMENT — PAIN DESCRIPTION - PAIN TYPE
TYPE: ACUTE PAIN
TYPE: ACUTE PAIN

## 2025-01-04 ASSESSMENT — PAIN DESCRIPTION - DIRECTION: RADIATING_TOWARDS: FLANK

## 2025-01-04 NOTE — LETTER
January 5, 2025    Patient: Alexia Gonzáles   YOB: 1960   Date of Visit: 1/4/2025       To Whom It May Concern:    Alexia Gonzáles was seen and treated in our emergency department on 1/4/2025. She may return to work on 1/7/25..    If you have any questions or concerns, please don't hesitate to call.              CC: No Recipients

## 2025-01-04 NOTE — LETTER
January 5, 2025    Patient: Alexia Gonzáles   YOB: 1960   Date of Visit: 1/4/2025       To Whom It May Concern:    Alexia Gonzáles was seen and treated in our emergency department on 1/4/2025. She may return to work on 1/7/25. .    If you have any questions or concerns, please don't hesitate to call.              CC: No Recipients

## 2025-01-05 NOTE — ED PROVIDER NOTES
HPI   Chief Complaint   Patient presents with   • Fall     Pt c/o slipping on a wet floor, pt denies LOC or hitting her head, Pt c/o right flank pain       64-year-old female presented emergency department with a chief complaint pain in her right flank.  Ba after mechanical slip and fall on a wet floor today.  She did not hit her head no loss of consciousness.  She is able to bear weight.  Taken nothing for relief.  Denies chest pain shortness of breath numbness weakness.  Denies any other injury or trauma.  Denies anticoagulant.  No other complaint.              Patient History   Past Medical History:   Diagnosis Date   • Back pain    • Depression      History reviewed. No pertinent surgical history.  Family History   Problem Relation Name Age of Onset   • Blood clot Mother     • Cancer Father       Social History     Tobacco Use   • Smoking status: Every Day     Current packs/day: 1.00     Types: Cigarettes   • Smokeless tobacco: Never   Vaping Use   • Vaping status: Never Used   Substance Use Topics   • Alcohol use: Not Currently     Alcohol/week: 1.0 standard drink of alcohol     Types: 1 Glasses of wine per week     Comment: Ocassionly   • Drug use: Yes     Types: Marijuana     Comment: Occasionally.  Last use 09/28/2027.       Physical Exam   ED Triage Vitals   Temperature Heart Rate Respirations BP   01/04/25 1920 01/04/25 1920 01/04/25 1920 01/04/25 1921   36 °C (96.8 °F) 61 18 135/82      Pulse Ox Temp Source Heart Rate Source Patient Position   01/04/25 1920 01/04/25 1920 -- --   98 % Temporal        BP Location FiO2 (%)     -- --             Physical Exam  Vitals and nursing note reviewed.   Constitutional:       Appearance: Normal appearance.   HENT:      Head: Normocephalic.      Nose: Nose normal.      Mouth/Throat:      Mouth: Mucous membranes are moist.   Cardiovascular:      Rate and Rhythm: Normal rate.   Pulmonary:      Effort: Pulmonary effort is normal.   Abdominal:      General: Abdomen is  flat.   Musculoskeletal:      Cervical back: Normal range of motion.      Comments: Tenderness right flank region, soft no rebound or guarding no crepitus not freely movable   Skin:     General: Skin is warm.   Neurological:      General: No focal deficit present.      Mental Status: She is alert and oriented to person, place, and time.   Psychiatric:         Mood and Affect: Mood normal.           ED Course & MDM   Diagnoses as of 01/04/25 2104   Fall, initial encounter   Flank pain                 No data recorded     Jaguar Coma Scale Score: 15 (01/04/25 1921 : Ludmila Mccartney RN)                           Medical Decision Making  I have seen and evaluated this patient.  The attending physician has also seen and evaluated this patient.  Vital signs, laboratory testing and diagnostic images if applicable have been reviewed.  All laboratory and imaging is interpreted by myself unless otherwise stated.  Radiology studies are also formally interpreted by radiologist.    Anticipate discharge if no acute injury or abdominal abnormality    Labs Reviewed - No data to display  CT abdomen pelvis wo IV contrast    (Results Pending)  Medications  morphine injection 4 mg (4 mg intramuscular Given 1/4/25 1934)  ondansetron ODT (Zofran-ODT) disintegrating tablet 4 mg (4 mg oral Given 1/4/25 1935)  New Prescriptions  No medications on file            Procedure  Procedures     Braeden Banks PA-C  01/04/25 2104

## 2025-01-05 NOTE — DISCHARGE INSTRUCTIONS
Thank you for choosing [facility]. It was my pleasure to be involved in your care today.         As of today's visit, based on reasonable likelihood, that it is safe for you to be discharged back to your residence to follow-up as an outpatient for ongoing management of your medical problem. You should follow-up with any referrals / primary provider as soon as possible. The contacts (number, addresses) are listed below.         Important:  Even though we think it is safe for you to go home, there is always a small chance that we are missing something that could require hospitalization.  Therefore it is very important that if you get worse or develops any new symptoms that you return here as soon as possible to be re-evaluated.  This includes return of symptoms that have resolved such as fainting, chest pain, or symptoms that could be warning signs for stroke important:  Even though we think it is safe for you to go home, there is always a small chance that we are missing something that could require hospitalization.  Therefore it is very important that if you get worse or develops any new symptoms that you return here as soon as possible to be re-evaluated.  This includes return of symptoms that have resolved such as fainting, chest pain, or symptoms that could be warning signs for stroke         Make sure your pharmacy and primary doctor is aware of any new medications prescribed today.          It is your responsibility to contact as soon as possible, and follow through with, any referrals you were given today. We do recommend you inform them you are a [facility] follow-up patient, as often they can better accommodate your need to be seen, provided their schedules allow. We will, and have, made every effort to ensure you have access to adequate follow-up specialists available.          All problems may not be able to be fixed in one ER visit. This is why timely ongoing care is important, and this  is a responsibility you share in. Further, you are free to follow up with any provider you choose, and this is not limited to our suggestion.          If cultures were obtained today, you will be contacted should anything result that would require further treatment. Please contact the ED at the number provided with questions.          Having trouble affording medications? Try [email or web address]! (This is not a hospital endorsed website, merely a recommendation based on my own personal experiences with [employer])

## 2025-01-06 ENCOUNTER — OFFICE VISIT (OUTPATIENT)
Dept: PRIMARY CARE | Facility: CLINIC | Age: 65
End: 2025-01-06
Payer: COMMERCIAL

## 2025-01-06 PROBLEM — M48.062 SPINAL STENOSIS, LUMBAR REGION WITH NEUROGENIC CLAUDICATION: Status: ACTIVE | Noted: 2025-01-06

## 2025-01-06 PROCEDURE — 99214 OFFICE O/P EST MOD 30 MIN: CPT | Performed by: NURSE PRACTITIONER

## 2025-01-06 ASSESSMENT — ENCOUNTER SYMPTOMS
CARDIOVASCULAR NEGATIVE: 1
RESPIRATORY NEGATIVE: 1
MUSCULOSKELETAL NEGATIVE: 1
GASTROINTESTINAL NEGATIVE: 1
CONSTITUTIONAL NEGATIVE: 1

## 2025-01-06 NOTE — PROGRESS NOTES
"Chief Complaint  Alexia Gonzáles is a 64 y.o. female presenting for \"No chief complaint on file..\"    HPI     Alexia Gonzáles is a 64 y.o. female presenting for       Past Medical History  Patient Active Problem List    Diagnosis Date Noted    Fall 01/04/2025    Flank pain 01/04/2025    Contusion of soft tissue 01/04/2025    Lumbar radiculopathy 08/27/2024    Essential hypertension 10/10/2023    Hyperlipidemia 10/10/2023    Lumbago with sciatica, left side 10/10/2023    Other chronic pain 10/10/2023    Vitamin D deficiency 10/10/2023        Medications  Current Outpatient Medications   Medication Instructions    gabapentin (NEURONTIN) 300 mg, oral, 3 times daily    losartan (COZAAR) 50 mg, oral, Daily    oxyCODONE-acetaminophen (Percocet) 5-325 mg tablet 1 tablet, oral, Every 6 hours PRN    sertraline (ZOLOFT) 50 mg, oral, Daily        Surgical History  She has no past surgical history on file.     Social History  She reports that she has been smoking cigarettes. She has never used smokeless tobacco. She reports that she does not currently use alcohol after a past usage of about 1.0 standard drink of alcohol per week. She reports current drug use. Drug: Marijuana.    Family History  Family History   Problem Relation Name Age of Onset    Blood clot Mother      Cancer Father          Allergies  Patient has no known allergies.    ROS  Review of Systems   Constitutional: Negative.    Respiratory: Negative.     Cardiovascular: Negative.    Gastrointestinal: Negative.    Genitourinary: Negative.    Musculoskeletal: Negative.         Last Recorded Vitals  There were no vitals taken for this visit.    Physical Exam  Vitals and nursing note reviewed.   Constitutional:       Appearance: Normal appearance.   Cardiovascular:      Rate and Rhythm: Normal rate and regular rhythm.      Pulses: Normal pulses.      Heart sounds: Normal heart sounds.   Pulmonary:      Effort: Pulmonary effort is normal.      Breath sounds: Normal " breath sounds.   Skin:     Comments: Yellow across forehead, and left palm   Neurological:      Mental Status: She is alert.         Relevant Results      Assessment/Plan   Diagnoses and all orders for this visit:  Yellow skin (Primary)  -     Cancel: Hepatic function panel; Future  -     Lipase; Future  -     Comprehensive metabolic panel; Future  Neck swelling  -     TSH with reflex to Free T4 if abnormal; Future          COUNSELING      Medication education:              Education:  The patient is counseled regarding potential side-effects of any and all new medications             Understanding:  Patient expressed understanding             Adherence:  No barriers to adherence identified        Trupti Callahan, APRN-CNP

## 2025-01-08 ENCOUNTER — CLINICAL SUPPORT (OUTPATIENT)
Dept: PREADMISSION TESTING | Facility: HOSPITAL | Age: 65
End: 2025-01-08
Payer: COMMERCIAL

## 2025-01-08 ENCOUNTER — OFFICE VISIT (OUTPATIENT)
Dept: PRIMARY CARE | Facility: CLINIC | Age: 65
End: 2025-01-08
Payer: COMMERCIAL

## 2025-01-08 VITALS
WEIGHT: 160 LBS | TEMPERATURE: 97 F | SYSTOLIC BLOOD PRESSURE: 124 MMHG | HEIGHT: 65 IN | DIASTOLIC BLOOD PRESSURE: 70 MMHG | OXYGEN SATURATION: 97 % | RESPIRATION RATE: 18 BRPM | BODY MASS INDEX: 26.66 KG/M2 | HEART RATE: 68 BPM

## 2025-01-08 DIAGNOSIS — M54.50 ACUTE LEFT-SIDED LOW BACK PAIN WITHOUT SCIATICA: Primary | ICD-10-CM

## 2025-01-08 DIAGNOSIS — M48.062 SPINAL STENOSIS, LUMBAR REGION WITH NEUROGENIC CLAUDICATION: ICD-10-CM

## 2025-01-08 PROCEDURE — 3008F BODY MASS INDEX DOCD: CPT | Performed by: NURSE PRACTITIONER

## 2025-01-08 PROCEDURE — 99214 OFFICE O/P EST MOD 30 MIN: CPT | Performed by: NURSE PRACTITIONER

## 2025-01-08 PROCEDURE — 3074F SYST BP LT 130 MM HG: CPT | Performed by: NURSE PRACTITIONER

## 2025-01-08 PROCEDURE — 3078F DIAST BP <80 MM HG: CPT | Performed by: NURSE PRACTITIONER

## 2025-01-08 RX ORDER — IBUPROFEN 800 MG/1
800 TABLET ORAL EVERY 8 HOURS PRN
Qty: 90 TABLET | Refills: 1 | Status: SHIPPED | OUTPATIENT
Start: 2025-01-08 | End: 2025-03-09

## 2025-01-08 ASSESSMENT — ENCOUNTER SYMPTOMS
LOSS OF SENSATION IN FEET: 0
DEPRESSION: 0
OCCASIONAL FEELINGS OF UNSTEADINESS: 0
RESPIRATORY NEGATIVE: 1
MUSCULOSKELETAL NEGATIVE: 1
GASTROINTESTINAL NEGATIVE: 1
CARDIOVASCULAR NEGATIVE: 1
CONSTITUTIONAL NEGATIVE: 1

## 2025-01-08 ASSESSMENT — PATIENT HEALTH QUESTIONNAIRE - PHQ9
2. FEELING DOWN, DEPRESSED OR HOPELESS: NOT AT ALL
SUM OF ALL RESPONSES TO PHQ9 QUESTIONS 1 AND 2: 0
1. LITTLE INTEREST OR PLEASURE IN DOING THINGS: NOT AT ALL

## 2025-01-08 ASSESSMENT — PAIN SCALES - GENERAL: PAINLEVEL_OUTOF10: 10-WORST PAIN EVER

## 2025-01-08 NOTE — PROGRESS NOTES
"Chief Complaint  Alexia Gonzáles is a 64 y.o. female presenting for \"Follow-up (Dr Loya pt- ER follow up from fall on 1/04. Has a large bruise on low right back, and sates she did not think she had hit her head but the top of her head hurts. States she is on percocet for pain).\"    HPI     Alexia Gonzáles is a 64 y.o. female presenting for an ER follow-up after falling has a bruise on her right lower back and the top of her head hurts was given Percocet for pain she is a Dr. Jovon patient has been on Percocet for 3 days now explained to patient that she will need to transition over to prescription strength ibuprofen or Aleve for pain,       Past Medical History  Patient Active Problem List    Diagnosis Date Noted    Fall 01/04/2025    Flank pain 01/04/2025    Contusion of soft tissue 01/04/2025    Lumbar radiculopathy 08/27/2024    Essential hypertension 10/10/2023    Hyperlipidemia 10/10/2023    Lumbago with sciatica, left side 10/10/2023    Other chronic pain 10/10/2023    Vitamin D deficiency 10/10/2023    Spinal stenosis, lumbar region with neurogenic claudication 01/06/2025        Medications  Current Outpatient Medications   Medication Instructions    gabapentin (NEURONTIN) 300 mg, oral, 3 times daily    ibuprofen 800 mg, oral, Every 8 hours PRN    losartan (COZAAR) 50 mg, oral, Daily    oxyCODONE-acetaminophen (Percocet) 5-325 mg tablet 1 tablet, oral, Every 6 hours PRN    sertraline (ZOLOFT) 50 mg, oral, Daily        Surgical History  She has no past surgical history on file.     Social History  She reports that she has been smoking cigarettes. She has been exposed to tobacco smoke. She has never used smokeless tobacco. She reports that she does not currently use alcohol after a past usage of about 1.0 standard drink of alcohol per week. She reports current drug use. Drug: Marijuana.    Family History  Family History   Problem Relation Name Age of Onset    Blood clot Mother      Cancer Father   "        Allergies  Patient has no known allergies.    ROS  Review of Systems   Constitutional: Negative.    Respiratory: Negative.     Cardiovascular: Negative.    Gastrointestinal: Negative.    Genitourinary: Negative.    Musculoskeletal: Negative.         Last Recorded Vitals  /70 (BP Location: Left arm, Patient Position: Sitting, BP Cuff Size: Adult)   Pulse 68   Temp 36.1 °C (97 °F)   Resp 18   Wt 72.6 kg (160 lb)   SpO2 97%     Physical Exam  Vitals and nursing note reviewed.   Constitutional:       Appearance: Normal appearance.   Cardiovascular:      Rate and Rhythm: Normal rate and regular rhythm.      Pulses: Normal pulses.      Heart sounds: Normal heart sounds.   Pulmonary:      Effort: Pulmonary effort is normal.      Breath sounds: Normal breath sounds.   Neurological:      Mental Status: She is alert.         Relevant Results      Assessment/Plan   Alexia was seen today for follow-up.  Diagnoses and all orders for this visit:  Acute left-sided low back pain without sciatica (Primary)  -     ibuprofen 800 mg tablet; Take 1 tablet (800 mg) by mouth every 8 hours if needed for moderate pain (4 - 6).          COUNSELING      Medication education:              Education:  The patient is counseled regarding potential side-effects of any and all new medications             Understanding:  Patient expressed understanding             Adherence:  No barriers to adherence identified        Trupti Callahan, APRN-CNP

## 2025-01-08 NOTE — LETTER
January 8, 2025     Patient: Alexia Gonzáles   YOB: 1960   Date of Visit: 1/8/2025       To Whom It May Concern:    Alexia Gonzáles was seen in my clinic on 1/8/2025 at 10:15 am. Please excuse Alexia for her absence from work on this day to make the appointment.    If you have any questions or concerns, please don't hesitate to call.         Sincerely,         Trupti Callahan, APRN-CNP

## 2025-01-20 ENCOUNTER — LAB (OUTPATIENT)
Dept: LAB | Facility: LAB | Age: 65
End: 2025-01-20
Payer: COMMERCIAL

## 2025-01-20 ENCOUNTER — PRE-ADMISSION TESTING (OUTPATIENT)
Dept: PREADMISSION TESTING | Facility: HOSPITAL | Age: 65
End: 2025-01-20
Payer: COMMERCIAL

## 2025-01-20 ENCOUNTER — DOCUMENTATION (OUTPATIENT)
Dept: PREADMISSION TESTING | Facility: HOSPITAL | Age: 65
End: 2025-01-20

## 2025-01-20 VITALS
BODY MASS INDEX: 26.81 KG/M2 | OXYGEN SATURATION: 98 % | DIASTOLIC BLOOD PRESSURE: 82 MMHG | RESPIRATION RATE: 18 BRPM | HEART RATE: 70 BPM | HEIGHT: 65 IN | WEIGHT: 160.94 LBS | SYSTOLIC BLOOD PRESSURE: 144 MMHG | TEMPERATURE: 97.3 F

## 2025-01-20 DIAGNOSIS — Z01.818 PREOP TESTING: ICD-10-CM

## 2025-01-20 DIAGNOSIS — Z01.818 PREOP TESTING: Primary | ICD-10-CM

## 2025-01-20 LAB
ABO GROUP (TYPE) IN BLOOD: NORMAL
ANION GAP SERPL CALC-SCNC: 10 MMOL/L (ref 10–20)
ANTIBODY SCREEN: NORMAL
APPEARANCE UR: CLEAR
BASOPHILS # BLD AUTO: 0.05 X10*3/UL (ref 0–0.1)
BASOPHILS NFR BLD AUTO: 0.6 %
BILIRUB UR STRIP.AUTO-MCNC: NEGATIVE MG/DL
BUN SERPL-MCNC: 16 MG/DL (ref 6–23)
CALCIUM SERPL-MCNC: 9 MG/DL (ref 8.6–10.3)
CHLORIDE SERPL-SCNC: 108 MMOL/L (ref 98–107)
CO2 SERPL-SCNC: 27 MMOL/L (ref 21–32)
COLOR UR: COLORLESS
CREAT SERPL-MCNC: 0.58 MG/DL (ref 0.5–1.05)
EGFRCR SERPLBLD CKD-EPI 2021: >90 ML/MIN/1.73M*2
EOSINOPHIL # BLD AUTO: 0.18 X10*3/UL (ref 0–0.7)
EOSINOPHIL NFR BLD AUTO: 2.2 %
ERYTHROCYTE [DISTWIDTH] IN BLOOD BY AUTOMATED COUNT: 15.2 % (ref 11.5–14.5)
GLUCOSE SERPL-MCNC: 77 MG/DL (ref 74–99)
GLUCOSE UR STRIP.AUTO-MCNC: NORMAL MG/DL
HCT VFR BLD AUTO: 39.9 % (ref 36–46)
HGB BLD-MCNC: 12.7 G/DL (ref 12–16)
IMM GRANULOCYTES # BLD AUTO: 0.02 X10*3/UL (ref 0–0.7)
IMM GRANULOCYTES NFR BLD AUTO: 0.2 % (ref 0–0.9)
KETONES UR STRIP.AUTO-MCNC: NEGATIVE MG/DL
LEUKOCYTE ESTERASE UR QL STRIP.AUTO: NEGATIVE
LYMPHOCYTES # BLD AUTO: 1.71 X10*3/UL (ref 1.2–4.8)
LYMPHOCYTES NFR BLD AUTO: 21.2 %
MCH RBC QN AUTO: 27.1 PG (ref 26–34)
MCHC RBC AUTO-ENTMCNC: 31.8 G/DL (ref 32–36)
MCV RBC AUTO: 85 FL (ref 80–100)
MONOCYTES # BLD AUTO: 0.6 X10*3/UL (ref 0.1–1)
MONOCYTES NFR BLD AUTO: 7.4 %
NEUTROPHILS # BLD AUTO: 5.5 X10*3/UL (ref 1.2–7.7)
NEUTROPHILS NFR BLD AUTO: 68.4 %
NITRITE UR QL STRIP.AUTO: NEGATIVE
NRBC BLD-RTO: 0 /100 WBCS (ref 0–0)
PH UR STRIP.AUTO: 5.5 [PH]
PLATELET # BLD AUTO: 334 X10*3/UL (ref 150–450)
POTASSIUM SERPL-SCNC: 4.1 MMOL/L (ref 3.5–5.3)
PROT UR STRIP.AUTO-MCNC: NEGATIVE MG/DL
RBC # BLD AUTO: 4.68 X10*6/UL (ref 4–5.2)
RBC # UR STRIP.AUTO: NEGATIVE /UL
RH FACTOR (ANTIGEN D): NORMAL
SODIUM SERPL-SCNC: 141 MMOL/L (ref 136–145)
SP GR UR STRIP.AUTO: 1.01
UROBILINOGEN UR STRIP.AUTO-MCNC: NORMAL MG/DL
WBC # BLD AUTO: 8.1 X10*3/UL (ref 4.4–11.3)

## 2025-01-20 PROCEDURE — 93005 ELECTROCARDIOGRAM TRACING: CPT | Performed by: PHYSICIAN ASSISTANT

## 2025-01-20 PROCEDURE — 85025 COMPLETE CBC W/AUTO DIFF WBC: CPT

## 2025-01-20 PROCEDURE — 99204 OFFICE O/P NEW MOD 45 MIN: CPT | Performed by: PHYSICIAN ASSISTANT

## 2025-01-20 PROCEDURE — 80048 BASIC METABOLIC PNL TOTAL CA: CPT

## 2025-01-20 PROCEDURE — 86850 RBC ANTIBODY SCREEN: CPT

## 2025-01-20 PROCEDURE — 93010 ELECTROCARDIOGRAM REPORT: CPT | Performed by: INTERNAL MEDICINE

## 2025-01-20 PROCEDURE — 86901 BLOOD TYPING SEROLOGIC RH(D): CPT

## 2025-01-20 PROCEDURE — 86900 BLOOD TYPING SEROLOGIC ABO: CPT

## 2025-01-20 PROCEDURE — 83036 HEMOGLOBIN GLYCOSYLATED A1C: CPT

## 2025-01-20 PROCEDURE — 81003 URINALYSIS AUTO W/O SCOPE: CPT

## 2025-01-20 PROCEDURE — 87081 CULTURE SCREEN ONLY: CPT | Mod: AHULAB | Performed by: PHYSICIAN ASSISTANT

## 2025-01-20 RX ORDER — BISMUTH SUBSALICYLATE 262 MG
1 TABLET,CHEWABLE ORAL DAILY
COMMUNITY

## 2025-01-20 RX ORDER — CHLORHEXIDINE GLUCONATE ORAL RINSE 1.2 MG/ML
SOLUTION DENTAL
Qty: 475 ML | Refills: 0 | Status: SHIPPED | OUTPATIENT
Start: 2025-01-20

## 2025-01-20 ASSESSMENT — ENCOUNTER SYMPTOMS
SKIN CHANGES: 0
FEVER: 0
TROUBLE SWALLOWING: 0
COUGH: 0
BRUISES/BLEEDS EASILY: 0
DYSPNEA WITH EXERTION: 0
SHORTNESS OF BREATH: 0
HEMOPTYSIS: 0
WOUND: 0
RHINORRHEA: 0
MYALGIAS: 1
DYSPNEA AT REST: 0
EYE PAIN: 0
WHEEZING: 0
NECK STIFFNESS: 0
PALPITATIONS: 0
ABDOMINAL PAIN: 0
EXCESSIVE BLEEDING: 0
SINUS CONGESTION: 0
UNEXPECTED WEIGHT CHANGE: 0
CHILLS: 0
VOMITING: 0
NUMBNESS: 0
LIMITED RANGE OF MOTION: 0
BLOOD IN STOOL: 0
CONFUSION: 0
DOUBLE VISION: 0
VISUAL CHANGE: 0
LIGHT-HEADEDNESS: 0
ABDOMINAL DISTENTION: 0
DIARRHEA: 0
WEAKNESS: 0
NAUSEA: 0
EYE DISCHARGE: 0
NECK PAIN: 0
CONSTIPATION: 0
DYSURIA: 0
ARTHRALGIAS: 1
DIFFICULTY URINATING: 0

## 2025-01-20 NOTE — PREPROCEDURE INSTRUCTIONS
Medication List            Accurate as of January 20, 2025  1:06 PM. Always use your most recent med list.                chlorhexidine 0.12 % solution  Commonly known as: Peridex  Swish for 30 seconds and spit 15mL of solution the night before and morning of surgery  Medication Adjustments for Surgery: Take/Use as prescribed     gabapentin 300 mg capsule  Commonly known as: Neurontin  Take 1 capsule (300 mg) by mouth 3 times a day.  Medication Adjustments for Surgery: Take on the morning of surgery     ibuprofen 800 mg tablet  Take 1 tablet (800 mg) by mouth every 8 hours if needed for moderate pain (4 - 6).  Additional Medication Adjustments for Surgery: Take last dose 7 days before surgery     losartan 50 mg tablet  Commonly known as: Cozaar  Take 1 tablet (50 mg) by mouth once daily.  Notes to patient: HOLD evening prior to surgery and morning of surgery        multivitamin tablet  Additional Medication Adjustments for Surgery: Take last dose 7 days before surgery     sertraline 50 mg tablet  Commonly known as: Zoloft  TAKE ONE TABLET BY MOUTH EVERY DAY  Medication Adjustments for Surgery: Take/Use as prescribed                          **Concerning above medication instructions, if medication is normally taken at night, continue normal schedule.**  **DO NOT TAKE NIGHT PRIOR AND MORNING OF SURGERY**    CONTACT SURGEON'S OFFICE IF YOU DEVELOP:  * Fever = 100.4 F   * New respiratory symptoms (e.g. cough, shortness of breath, respiratory distress, sore throat)  * Recent loss of taste or smell  *Flu like symptoms such as headache, fatigue or gastrointestinal symptoms  * You develop any open sores, shingles, burning or painful urination   AND/OR:  * You no longer wish to have the surgery.  * Any other personal circumstances change that may lead to the need to cancel or defer this surgery.  *You were admitted to any hospital within one week of your planned procedure.    SMOKING:  *Quitting smoking can make a huge  difference to your health and recovery from surgery.    *If you need help with quitting, call 8-012-QUIT-NOW.    THE DAY OF SURGERY:  *Do not eat any food after midnight the night before surgery.   *You must drink 13.5 ounces of clear liquids (i.e. water, black coffee (no milk or cream), tea, apple juice or electrolyte drinks (gatorade)) 2 hours before your arrival time.  *You may chew gum until 2 hours before your surgery    SURGICAL TIME  *You will be contacted between 2 p.m. and 6 p.m. the business day before your surgery with your arrival time.  *If you haven't received a call by 6pm, call [phone].  *Scheduled surgery times may change and you will be notified if this occurs-check your personal voicemail for any updates.    ON THE MORNING OF SURGERY:  *Wear comfortable, loose fitting clothing.   *Do not use moisturizers, creams, lotions or perfume.  *All jewelry and valuables should be left at home.  *Prosthetic devices such as contact lenses, hearing aids, dentures, eyelash extensions, hairpins and body piercing must be removed before surgery.    BRING WITH YOU:  *Photo ID and insurance card  *Current list of medicines and allergies  *Pacemaker/Defibrillator/Heart stent cards  *CPAP machine and mask  *Slings/splints/crutches  *Copy of your complete Advanced Directive/DHPOA-if applicable  *Neurostimulator implant remote    PARKING AND ARRIVAL:  *Check in at the Main Entrance desk and let them know you are here for surgery.  *You will be directed to the 2nd floor surgical waiting area.    AFTER OUTPATIENT SURGERY:  *A responsible adult MUST accompany you at the time of discharge and stay with you for 24 hours after your surgery.  *You may NOT drive yourself home after surgery.  *You may use a taxi or ride sharing service ([employer], Uber) to return home ONLY if you are accompanied by a friend or family member.  *Instructions for resuming your medications will be provided by your surgeon.         Patient  Information: Oral/Dental Rinse  **This is a prescription; pick it up at your preferred local pharmacy **  What is oral/dental rinse?   It is a mouthwash. It is a way of cleaning the mouth with a germ killing solution before your surgery.  The solution contains chlorhexidine, commonly known as CHG.   It is used inside the mouth to kill a bacteria known as Staphylococcus aureus.  Let your doctor know if you are allergic to Chlorhexidine.    Why do I need to use CHG oral/dental rinse?  The CHG oral/dental rinse helps to kill a bacteria in your mouth known a Staphylococcus aureus.     This reduces the risk of infection at the surgical site.      Using your CHG oral/dental rinse  STEPS:  Use your CHG oral/dental rinse after you brush your teeth the night before (at bedtime) and the morning of your surgery.  Follow all directions on your prescription label.    Use the cap on the container to measure 15ml (fill cap to fill line)  Swish (gargle if you can) the mouthwash in your mouth for at least 30 seconds, (do not to swallow) spit out  After you use your CHG rinse, do not rinse your mouth with water, drink or eat.  Please refer to prescription label for the appropriate time to resume oral intake  Dental rinse comes in one size bottle: 473ml ~16oz.  You will have leftover    rinse, discard after this use.    What side effects might I have using the CHG oral/dental rinse?  CHG rinse will stick to plaque on the teeth.  Brush and floss just before use.  Teeth brushing will help avoid staining of plaque during use.    Who should I contact if I have questions about the CHG oral/dental rinse?  Please call [facility], Preadmission Testing at [phone] if you have any questions      Home Preoperative Antibacterial Shower     What is a home preoperative antibacterial shower?  This shower is a way of cleaning the skin with a germ killing soap before surgery.  The soap contains chlorhexidine,  commonly known as CHG.  CHG is a soap for your skin with germ killing ability.  Let your doctor know if you are allergic to chlorhexidine.    Why do I need to take a preoperative antibacterial shower?  Skin is not sterile.  It is best to try to make your skin as free of germs as possible before surgery.  Proper cleansing with a germ killing soap before surgery can lower the number of germs on your skin.  This helps to reduce the risk of infection at the surgical site.  Following the instructions listed below will help you prepare your skin for surgery.      How do I use the CHG skin cleanser?  Steps:  Begin using your CHG soap five days before your scheduled surgery on ________________________.    Days 1-4 Shower before bed:  Wash your face and genitals with your normal soap and rinse.    Wash and rinse your hair using the CHG soap. Rinse completely, do not condition your   Hair.          3.    Apply the CHG soap to a clean wet washcloth.  Turn the water off or move away                From the water spray to avoid premature rinsing of the CHG soap as you are applying.     4.   Lather your entire body from the neck down.  Do not use on your face or genitals.   Pay special attention to the area(s) where your incision(s) will be located unless they are on your face.  Avoid scrubbing your skin too hard.  The important point is to have the CHG soap sit on your skin for 3 minutes.    When the 3 minutes are up, turn on the water and rinse the CHG soap off your body completely.   Pat yourself dry with a clean, freshly-laundered towel.  Dress in clean, freshly laundered night clothes.    Be sure to sleep with clean, freshly laundered sheets.  Day 5:  Last shower is the morning before surgery: Follow above Instructions.    NOTE:    *Hair extensions should be removed    *Keep CHG soap out of eyes and ear canals   *DO NOT wash with regular soap on your body after you have used the CHG        soap solution  *DO NOT apply  powders, lotions, or perfume.  *Deodorant may be used days 1-4, BUT NOT the day of surgery    Who should I contact if I have any questions regarding the use of CHG soap?  Call the [facility], Preadmission Testing at [phone] if you have any questions.              Patient Information: Pre-Operative Infection Prevention Measures     Why did I have my nose, under my arms and groin swabbed?  The purpose of the swab is to identify Staphylococcus aureus inside your nose or on your skin.  The swab was sent to the laboratory for culture.  A positive swab/culture for Staphylococcus aureus is called colonization or carriage.      What is Staphylococcus aureus?  Staphylococcus aureus, also known as “staph”, is a germ found on the skin or in the nose of healthy people.  Sometimes Staphylococcus aureus can get into the body and cause an infection.  This can be minor (such as pimples, boils or other skin problems).  It might also be serious (such as blood infection, pneumonia or a surgical site infection).    What is Staphylococcus aureus colonization or carriage?  Colonization or carriage means that a person has the germ but is not sick from it.  These bacteria can be spread on the hands or when breathing or sneezing.    How is Staphylococcus aureus spread?  It is most often spread by close contact with a person or item that carries it.    What happens if my culture is positive for Staphylococcus aureus?  Your doctor/medical team will use this information to guide any antibiotic treatment which may be necessary.  Regardless of the culture results, we will clean the inside of your nose with a betadine swab just before you have your surgery.      Will I get an infection if I have Staphylococcus aureus in my nose or on my skin?  Anyone can get an infection with Staphylococcus aureus.  However, the best way to reduce your risk of infection is to follow the instructions provided to you for the use  of your CHG soap and dental rinse.        Who should I contact if I have any questions?  Call the [facility], Preadmission Testing at [phone] if you have any questions.          Incentive Spirometer   You were provided with an incentive spirometer in CPM/PAT, please follow the below instructions.   You were not provided an incentive spirometer in CPM, please disregard the incentive spirometer instructions  What is an incentive spirometer?  An incentive spirometer is a device used before and after surgery to “exercise” your lungs.  It helps you to take deeper breaths to expand your lungs.  Below is an example of a basic incentive spirometer.  The device you receive may differ slightly but they all function the same.    Why do I need to use an incentive spirometer?  Using your incentive spirometer prepares your lungs for surgery and helps prevent lung problems after surgery.  How do I use my incentive spirometer?  When you're using your incentive spirometer, make sure to breathe through your mouth. If you breathe through your nose, the incentive spirometer won't work properly. You can hold your nose if you have trouble.  If you feel dizzy at any time, stop and rest. Try again at a later time.  Follow the steps below:  Set up your incentive spirometer, expand the flexible tubing and connect to the outlet.  Sit upright in a chair or bed. Hold the incentive spirometer at eye level.   Put the mouthpiece in your mouth and close your lips tightly around it. Slowly breathe out (exhale) completely.  Breathe in (inhale) slowly through your mouth as deeply as you can. As you take a breath, you will see the piston rise inside the large column. While the piston rises, the indicator should move upwards. It should stay in between the 2 arrows (see Figure).  Try to get the piston as high as you can, while keeping the indicator between the arrows.   If the indicator doesn't stay between the arrows,  you're breathing either too fast or too slow.  When you get it as high as you can, hold your breath for 10 seconds, or as long as possible. While you're holding your breath, the piston will slowly fall to the base of the spirometer.  Once the piston reaches the bottom of the spirometer, breathe out slowly through your mouth. Rest for a few seconds.  Repeat 10 times. Try to get the piston to the same level with each breath.  Repeat every hour while awake  You can carefully clean the outside of the mouthpiece with an alcohol wipe or soap and water.        Preoperative Deep Breathing Exercises  Why it is important to do deep breathing exercises before my surgery?  Deep breathing exercises strengthen your breathing muscles.  This helps you to recover after your surgery and decreases the chance of breathing complications.  How are the deep breathing exercises done?  Sit straight with your back supported.  Breathe in deeply and slowly through your nose. Your lower rib cage should expand and your abdomen may move forward.  Hold that breath for 3 to 5 seconds.  Breathe out through pursed lips, slowly and completely.  Rest and repeat 10 times every hour while awake.  Rest longer if you become dizzy or lightheaded.

## 2025-01-20 NOTE — CPM/PAT H&P
[facility] Evaluation       Name: Alexia Gonzáles (Alexia Gonzáles)  /Age: 1960/64 y.o.       Date of Consult: 25    Referring Provider: Dr. Gallegos    Surgery, Date, and Length: L3-L5 Lumbar Spine Decompression and Fusion; L4-L5 Transforaminal Lumbar Interbody Fusion , 25, 240MIN    Alexia Gonzáles is a 64 y.o. year-old female who presents to the [facility] for perioperative risk assessment prior to surgery.    Patient presents with a primary diagnosis of lumbar spinal stenosis. She has developed rather severe back and bilateral leg pain with ambulation, which has been present for 1 year.  Her symptoms are consistent with neurogenic claudication.  Her ability to ambulate has become quite compromised.  She has been through a physical therapy program. She has not attempted LESI, as she was told they probably would not be of much benefit. Her MRI shows extremely severe stenosis at L3-4 and L4-5. Pt wishes to proceed with surgery as planned.     This note was created in part upon personal review of patient's medical records.      Patient is scheduled to have L3-L5 Lumbar Spine Decompression and Fusion; L4-L5 Transforaminal Lumbar Interbody Fusion       Pt denies any past history of anesthetic complications such as PONV, awareness, prolonged sedation, dental damage, aspiration, cardiac arrest, difficult intubation, difficult I.V. access or unexpected hospital admissions.  NO malignant hyperthermia and or pseudocholinesterase deficiency.  No history of blood transfusions     The patient is not a Religion and will accept blood and blood products if medically indicated.   Type and screen sent.     Past Medical History:   Diagnosis Date    Arthritis 2020    Back pain     Chronic pain disorder     Depression     Hypertension     Low back pain     Spinal stenosis        Past Surgical History:   Procedure Laterality Date    CHOLECYSTECTOMY         Patient  reports being sexually active and has had  partner(s) who are male. She reports using the following method of birth control/protection: None.    Family History   Problem Relation Name Age of Onset    Blood clot Mother Belkys     Hypertension Mother Belkys     Leukemia Father Don     Hypertension Father Don     Cancer Father Don      Social History     Socioeconomic History    Marital status:      Spouse name: Not on file    Number of children: Not on file    Years of education: Not on file    Highest education level: Not on file   Occupational History    Not on file   Tobacco Use    Smoking status: Former     Current packs/day: 0.00     Average packs/day: 0.8 packs/day for 48.0 years (36.0 ttl pk-yrs)     Types: Cigarettes     Start date:      Quit date: 2025     Years since quittin.0     Passive exposure: Current    Smokeless tobacco: Never   Vaping Use    Vaping status: Never Used   Substance and Sexual Activity    Alcohol use: Not Currently    Drug use: Yes     Types: Marijuana     Comment: 2 times/month    Sexual activity: Yes     Partners: Male     Birth control/protection: None   Other Topics Concern    Not on file   Social History Narrative    Not on file     Social Drivers of Health     Financial Resource Strain: Not on file   Food Insecurity: Unknown (2023)    [facility]    Hunger Vital Sign     Worried About Running Out of Food in the Last Year: Never true     Ran Out of Food in the Last Year: Not on file   Transportation Needs: Not on file   Physical Activity: Not on file   Stress: Not on file   Social Connections: Not on file   Intimate Partner Violence: Not on file   Housing Stability: Not on file        No Known Allergies    Current Outpatient Medications   Medication Instructions    chlorhexidine (Peridex) 0.12 % solution Swish for 30 seconds and spit 15mL of solution the night before and morning of surgery    gabapentin (NEURONTIN) 300 mg, oral, 3 times daily    ibuprofen 800 mg,  oral, Every 8 hours PRN    losartan (COZAAR) 50 mg, oral, Daily    multivitamin tablet 1 tablet, Daily    sertraline (ZOLOFT) 50 mg, oral, Daily           PAT ROS:   Constitutional:    no fever   no chills   no unexpected weight change  Neuro/Psych:    no numbness   no weakness   no light-headedness   no confusion  Eyes:    no discharge   no pain   no vision loss   no diplopia   no visual disturbance  Ears:    no ear pain   no hearing loss   no tinnitus  Nose:    no nasal discharge   no sinus congestion   no epistaxis  Mouth:    no dental issues   no mouth pain   no oral bleeding   no mouth lesions  Throat:    no throat pain   no dysphagia  Neck:    no neck pain   no neck stiffness  Cardio:    Functional 4 Mets. Patient denies SOB walking up 2 flights of stairs   Limited due to back pain; walking dog for short distances  Cooking, cleaning, grocery shopping   no chest pain   no palpitations   no peripheral edema   no dyspnea   no FERRER  Respiratory:    no cough   no wheezing   no hemoptysis   no shortness of breath  Endocrine:    no cold intolerance   no heat intolerance  GI:    no abdominal distention   no abdominal pain   no constipation   no diarrhea   no nausea   no vomiting   no blood in stool  :    no difficulty urinating   no dysuria   no oliguria  Musculoskeletal:    arthralgias (LBP)   myalgias (BLE)   no decreased ROM  Hematologic:    does not bruise/bleed easily   no excessive bleeding   no history of blood transfusion   no blood clots  Skin:   no skin changes   no sores/wound   no rash      Physical Exam  Constitutional:       General: She is not in acute distress.     Appearance: Normal appearance. She is not ill-appearing, toxic-appearing or diaphoretic.   HENT:      Head: Normocephalic and atraumatic.      Nose: Nose normal. No congestion or rhinorrhea.      Mouth/Throat:      Mouth: Mucous membranes are moist.      Pharynx: No posterior oropharyngeal erythema.   Eyes:      Extraocular Movements:  "Extraocular movements intact.      Conjunctiva/sclera: Conjunctivae normal.   Cardiovascular:      Rate and Rhythm: Normal rate and regular rhythm.      Pulses: Normal pulses.      Heart sounds: Normal heart sounds. No murmur heard.     No friction rub. No gallop.   Pulmonary:      Effort: Pulmonary effort is normal. No respiratory distress.      Breath sounds: Normal breath sounds. No stridor. No wheezing, rhonchi or rales.   Abdominal:      General: Bowel sounds are normal. There is no distension.      Palpations: Abdomen is soft. There is no mass.      Tenderness: There is no abdominal tenderness. There is no guarding or rebound.      Hernia: No hernia is present.   Musculoskeletal:         General: Tenderness (low back pain with standing, lumbar extension) present. No swelling, deformity or signs of injury. Normal range of motion.      Cervical back: Normal range of motion and neck supple. No rigidity or tenderness.   Skin:     General: Skin is warm and dry.      Coloration: Skin is not jaundiced or pale.      Findings: No bruising, erythema or rash.   Neurological:      General: No focal deficit present.      Mental Status: She is alert and oriented to person, place, and time.      Cranial Nerves: No cranial nerve deficit.      Sensory: No sensory deficit.      Motor: No weakness.      Coordination: Coordination normal.   Psychiatric:         Mood and Affect: Mood normal.         Behavior: Behavior normal.          PAT AIRWAY:   Airway:     Mallampati::  II    Neck ROM::  Full   Multiple missing teeth; no loose teeth        Visit Vitals  /82   Pulse 70   Temp 36.3 °C (97.3 °F)   Resp 18   Ht 1.651 m (5' 5\")   Wt 73 kg (160 lb 15 oz)   SpO2 98%   BMI 26.78 kg/m²   OB Status Menopausal   Smoking Status Former   BSA 1.83 m²      LABS:     Lab Results   Component Value Date    WBC 8.1 01/20/2025    HGB 12.7 01/20/2025    HCT 39.9 01/20/2025    MCV 85 01/20/2025     01/20/2025      Lab Results "   Component Value Date    GLUCOSE 77 01/20/2025    CALCIUM 9.0 01/20/2025     01/20/2025    K 4.1 01/20/2025    CO2 27 01/20/2025     (H) 01/20/2025    BUN 16 01/20/2025    CREATININE 0.58 01/20/2025      Lab Results   Component Value Date    HGBA1C 5.5 01/20/2025      Component  Ref Range & Units 14:12   Protime  9.8 - 12.8 seconds 10.9   INR  0.9 - 1.1 1.0   aPTT  27 - 38 seconds 34     EKG 1/20/25  NSR  Low voltage QRS  Cannot r/o anterior infarct, age undetermined  Abnormal EKG  Vent rate = 61 bpm    Assessment and Plan:       64 y.o.  female  scheduled for L3-L5 Lumbar Spine Decompression and Fusion; L4-L5 Transforaminal Lumbar Interbody Fusion  on 1/31/25 with Dr. Gallegos for  lumbar spinal stenosis.   Presents to CPM today for perioperative risk stratification and optimization        Cardiovascular:  Patient has no active cardiac symptoms.   Patient denies any chest pain, tightness, heaviness, pressure, radiating pain, palpitations, irregular heartbeats, lightheadedness, cough, congestion, shortness of breath, FERRER, PND, near syncope, weight loss or gain.    METS: 4+  RCRI: 0 points, 3.9%  risk for postoperative MACE     HTN - losartan HOLD evening prior to surgery and morning of surgery     Encouraged lifestyle modifications, low-sodium diet, and increase activity as tolerated.  Monitor BP and follow up with managing physician for readings sustaining >140/90.    HLD - pt not currently on any lipid lowering meds     Pulmonary:  No pulmonary diagnosis, however patient is at increased risk of perioperative complications secondary to  age > 60, duration of surgery > 2 hours  Stop Bang score is 3 placing patient at intermediate risk for MARY  ARISCAT: >45 points, 42.1% risk of in-hospital postoperative pulmonary complication  PRODIGY: Moderate risk for opioid induced respiratory depression    **Pt provided with deep breathing exercises, incentive spirometer and instructions for use during PAT visit  today**    Hematology:  Patient instructed to ambulate as soon as possible postoperatively to decrease thromboembolic risk.   Initiate mechanical DVT prophylaxis as soon as possible and initiate chemical prophylaxis when deemed safe from a bleeding standpoint post surgery.     LABS: CBC, BMP, Coag, T&S, MRSA, A1c, UA flex and EKG ordered    Followup: LABS, MRSA and A1c pending  LABS REVIEWED: unremarkable -   Caprini: 5    Risk assessment complete.  Patient is scheduled for a intermediate surgical risk procedure.        Preoperative medication instructions were provided and reviewed with the patient.  Any additional testing or evaluation was explained to the patient.  Nothing by mouth instructions were discussed and patient's questions were answered prior to conclusion to this encounter.  Patient verbalized understanding of preoperative instructions given in preadmission testing; discharge instructions available in EMR.    This note was dictated by a speech recognition.  Minor errors may have been detected in a speech recognition.

## 2025-01-20 NOTE — CPM/PAT NURSE NOTE
CPM/PAT Nurse Note      Name: Alexia Gonzáles (Alexia Gonzáles)  /Age: 1960/64 y.o.       Past Medical History:   Diagnosis Date    Arthritis 2020    Back pain     Chronic pain disorder     Depression     Hypertension     Low back pain     Spinal stenosis        Past Surgical History:   Procedure Laterality Date    CHOLECYSTECTOMY         Patient  reports being sexually active and has had partner(s) who are male. She reports using the following method of birth control/protection: None.    Family History   Problem Relation Name Age of Onset    Blood clot Mother Belkys     Hypertension Mother Belkys     Leukemia Father Don     Hypertension Father Don     Cancer Father Don        No Known Allergies    Prior to Admission medications    Medication Sig Start Date End Date Taking? Authorizing Provider   chlorhexidine (Peridex) 0.12 % solution Swish for 30 seconds and spit 15mL of solution the night before and morning of surgery 25   Trupti Rodriguez PA-C   gabapentin (Neurontin) 300 mg capsule Take 1 capsule (300 mg) by mouth 3 times a day. 24  Eugene Coates MD   ibuprofen 800 mg tablet Take 1 tablet (800 mg) by mouth every 8 hours if needed for moderate pain (4 - 6). 1/8/25 3/9/25  MARISOL Kendall-CNP   losartan (Cozaar) 50 mg tablet Take 1 tablet (50 mg) by mouth once daily. 3/7/24 3/7/25  Natasha Sigala MD   multivitamin tablet Take 1 tablet by mouth once daily.    Historical Provider, MD   sertraline (Zoloft) 50 mg tablet TAKE ONE TABLET BY MOUTH EVERY DAY 24   Natasha Sigala MD        PAT ROS     DASI Risk Score    No data to display       Caprini DVT Assessment    No data to display       Modified Frailty Index    No data to display       CHADS2 Stroke Risk  Current as of today        N/A 3 to 100%: High Risk   2 to < 3%: Medium Risk   0 to < 2%: Low Risk     Last Change: N/A          This score determines the patient's risk of having a stroke if the patient has  atrial fibrillation.        This score is not applicable to this patient. Components are not calculated.          Revised Cardiac Risk Index    No data to display       Apfel Simplified Score    No data to display       Risk Analysis Index Results This Encounter    No data found in the last 10 encounters.       Prodigy: High Risk  Total Score: 8              Prodigy Age Score           ARISCAT Score for Postoperative Pulmonary Complications      Flowsheet Row Pre-Admission Testing from 1/20/2025 in [facility] with Trupti Rodriguez PA-C   Age Calculated Score 3 filed at 01/20/2025 1305   Preoperative SpO2 0 filed at 01/20/2025 1305   Respiratory infection in the last month Either upper or lower (i.e., URI, bronchitis, pneumonia), with fever and antibiotic treatment 0 filed at 01/20/2025 1305   Preoperative anemia (Hgb less than 10 g/dl) 0 filed at 01/20/2025 1305   Surgical incision  24 filed at 01/20/2025 1305   Duration of surgery  23 filed at 01/20/2025 1305   Emergency Procedure  0 filed at 01/20/2025 1305   ARISCAT Total Score  50 filed at 01/20/2025 1305          Vicente Perioperative Risk for Myocardial Infarction or Cardiac Arrest (PRAFUL)    No data to display         Nurse Plan of Action:     After Visit Summary (AVS) reviewed and patient verbalized good understanding of medications and NPO instructions.  Pre-op infection prevention measures:  CHG showers and mouthwash reviewed, understanding voiced.  CHG soap given and patient verbalized need to pick CHG mouthwash at their preferred local pharmacy.

## 2025-01-21 LAB
ATRIAL RATE: 61 BPM
EST. AVERAGE GLUCOSE BLD GHB EST-MCNC: 111 MG/DL
HBA1C MFR BLD: 5.5 %
P AXIS: 42 DEGREES
P OFFSET: 191 MS
P ONSET: 142 MS
PR INTERVAL: 154 MS
Q ONSET: 219 MS
QRS COUNT: 10 BEATS
QRS DURATION: 74 MS
QT INTERVAL: 414 MS
QTC CALCULATION(BAZETT): 416 MS
QTC FREDERICIA: 416 MS
R AXIS: 47 DEGREES
T AXIS: 50 DEGREES
T OFFSET: 426 MS
VENTRICULAR RATE: 61 BPM

## 2025-01-22 ENCOUNTER — LAB (OUTPATIENT)
Dept: LAB | Facility: LAB | Age: 65
End: 2025-01-22
Payer: COMMERCIAL

## 2025-01-22 ENCOUNTER — TELEPHONE (OUTPATIENT)
Dept: PREADMISSION TESTING | Facility: HOSPITAL | Age: 65
End: 2025-01-22

## 2025-01-22 LAB
APTT PPP: 34 SECONDS (ref 27–38)
INR PPP: 1 (ref 0.9–1.1)
PROTHROMBIN TIME: 10.9 SECONDS (ref 9.8–12.8)
STAPHYLOCOCCUS SPEC CULT: ABNORMAL

## 2025-01-22 PROCEDURE — 85730 THROMBOPLASTIN TIME PARTIAL: CPT

## 2025-01-22 PROCEDURE — 85610 PROTHROMBIN TIME: CPT

## 2025-01-28 DIAGNOSIS — F32.A ANXIETY AND DEPRESSION: ICD-10-CM

## 2025-01-28 DIAGNOSIS — F41.9 ANXIETY AND DEPRESSION: ICD-10-CM

## 2025-01-28 DIAGNOSIS — Z76.0 MEDICATION REFILL: ICD-10-CM

## 2025-01-28 RX ORDER — SERTRALINE HYDROCHLORIDE 50 MG/1
50 TABLET, FILM COATED ORAL DAILY
Qty: 90 TABLET | Refills: 0 | Status: ON HOLD | OUTPATIENT
Start: 2025-01-28

## 2025-01-30 ENCOUNTER — ANESTHESIA EVENT (OUTPATIENT)
Dept: OPERATING ROOM | Facility: HOSPITAL | Age: 65
End: 2025-01-30
Payer: COMMERCIAL

## 2025-01-30 PROBLEM — M48.061 DEGENERATIVE LUMBAR SPINAL STENOSIS: Status: ACTIVE | Noted: 2025-01-30

## 2025-01-31 ENCOUNTER — ANESTHESIA (OUTPATIENT)
Dept: OPERATING ROOM | Facility: HOSPITAL | Age: 65
End: 2025-01-31
Payer: COMMERCIAL

## 2025-01-31 ENCOUNTER — APPOINTMENT (OUTPATIENT)
Dept: RADIOLOGY | Facility: HOSPITAL | Age: 65
DRG: 428 | End: 2025-01-31
Payer: COMMERCIAL

## 2025-01-31 ENCOUNTER — HOSPITAL ENCOUNTER (INPATIENT)
Facility: HOSPITAL | Age: 65
DRG: 428 | End: 2025-01-31
Attending: ORTHOPAEDIC SURGERY | Admitting: ORTHOPAEDIC SURGERY
Payer: COMMERCIAL

## 2025-01-31 DIAGNOSIS — M48.062 SPINAL STENOSIS, LUMBAR REGION WITH NEUROGENIC CLAUDICATION: ICD-10-CM

## 2025-01-31 DIAGNOSIS — M48.061 DEGENERATIVE LUMBAR SPINAL STENOSIS: Primary | ICD-10-CM

## 2025-01-31 PROBLEM — M54.50 CHRONIC LOW BACK PAIN: Status: ACTIVE | Noted: 2023-10-10

## 2025-01-31 LAB
ABO GROUP (TYPE) IN BLOOD: NORMAL
RH FACTOR (ANTIGEN D): NORMAL

## 2025-01-31 PROCEDURE — C1762 CONN TISS, HUMAN(INC FASCIA): HCPCS | Performed by: ORTHOPAEDIC SURGERY

## 2025-01-31 PROCEDURE — 2500000004 HC RX 250 GENERAL PHARMACY W/ HCPCS (ALT 636 FOR OP/ED)

## 2025-01-31 PROCEDURE — 63047 LAM FACETEC & FORAMOT LUMBAR: CPT | Performed by: ORTHOPAEDIC SURGERY

## 2025-01-31 PROCEDURE — 2780000003 HC OR 278 NO HCPCS: Performed by: ORTHOPAEDIC SURGERY

## 2025-01-31 PROCEDURE — 7100000002 HC RECOVERY ROOM TIME - EACH INCREMENTAL 1 MINUTE: Performed by: ORTHOPAEDIC SURGERY

## 2025-01-31 PROCEDURE — 72020 X-RAY EXAM OF SPINE 1 VIEW: CPT

## 2025-01-31 PROCEDURE — 0SG1071 FUSION OF 2 OR MORE LUMBAR VERTEBRAL JOINTS WITH AUTOLOGOUS TISSUE SUBSTITUTE, POSTERIOR APPROACH, POSTERIOR COLUMN, OPEN APPROACH: ICD-10-PCS | Performed by: ORTHOPAEDIC SURGERY

## 2025-01-31 PROCEDURE — 22614 ARTHRD PST TQ 1NTRSPC EA ADD: CPT | Performed by: ORTHOPAEDIC SURGERY

## 2025-01-31 PROCEDURE — 9420000001 HC RT PATIENT EDUCATION 5 MIN

## 2025-01-31 PROCEDURE — 2500000004 HC RX 250 GENERAL PHARMACY W/ HCPCS (ALT 636 FOR OP/ED): Mod: JZ | Performed by: ANESTHESIOLOGY

## 2025-01-31 PROCEDURE — 2500000005 HC RX 250 GENERAL PHARMACY W/O HCPCS: Performed by: ORTHOPAEDIC SURGERY

## 2025-01-31 PROCEDURE — C1713 ANCHOR/SCREW BN/BN,TIS/BN: HCPCS | Performed by: ORTHOPAEDIC SURGERY

## 2025-01-31 PROCEDURE — 3700000002 HC GENERAL ANESTHESIA TIME - EACH INCREMENTAL 1 MINUTE: Performed by: ORTHOPAEDIC SURGERY

## 2025-01-31 PROCEDURE — 36415 COLL VENOUS BLD VENIPUNCTURE: CPT | Performed by: ORTHOPAEDIC SURGERY

## 2025-01-31 PROCEDURE — C1889 IMPLANT/INSERT DEVICE, NOC: HCPCS | Performed by: ORTHOPAEDIC SURGERY

## 2025-01-31 PROCEDURE — 2500000005 HC RX 250 GENERAL PHARMACY W/O HCPCS: Performed by: ANESTHESIOLOGY

## 2025-01-31 PROCEDURE — 2500000004 HC RX 250 GENERAL PHARMACY W/ HCPCS (ALT 636 FOR OP/ED): Performed by: NURSE ANESTHETIST, CERTIFIED REGISTERED

## 2025-01-31 PROCEDURE — 0SG00AJ FUSION OF LUMBAR VERTEBRAL JOINT WITH INTERBODY FUSION DEVICE, POSTERIOR APPROACH, ANTERIOR COLUMN, OPEN APPROACH: ICD-10-PCS | Performed by: ORTHOPAEDIC SURGERY

## 2025-01-31 PROCEDURE — 3600000017 HC OR TIME - EACH INCREMENTAL 1 MINUTE - PROCEDURE LEVEL SIX: Performed by: ORTHOPAEDIC SURGERY

## 2025-01-31 PROCEDURE — 2500000004 HC RX 250 GENERAL PHARMACY W/ HCPCS (ALT 636 FOR OP/ED): Performed by: ORTHOPAEDIC SURGERY

## 2025-01-31 PROCEDURE — 7100000001 HC RECOVERY ROOM TIME - INITIAL BASE CHARGE: Performed by: ORTHOPAEDIC SURGERY

## 2025-01-31 PROCEDURE — 2500000001 HC RX 250 WO HCPCS SELF ADMINISTERED DRUGS (ALT 637 FOR MEDICARE OP)

## 2025-01-31 PROCEDURE — 00NY0ZZ RELEASE LUMBAR SPINAL CORD, OPEN APPROACH: ICD-10-PCS | Performed by: ORTHOPAEDIC SURGERY

## 2025-01-31 PROCEDURE — 22633 ARTHRD CMBN 1NTRSPC LUMBAR: CPT | Performed by: ORTHOPAEDIC SURGERY

## 2025-01-31 PROCEDURE — 20985 CPTR-ASST DIR MS PX: CPT | Performed by: ORTHOPAEDIC SURGERY

## 2025-01-31 PROCEDURE — 2720000007 HC OR 272 NO HCPCS: Performed by: ORTHOPAEDIC SURGERY

## 2025-01-31 PROCEDURE — 22842 INSERT SPINE FIXATION DEVICE: CPT | Performed by: ORTHOPAEDIC SURGERY

## 2025-01-31 PROCEDURE — 2500000004 HC RX 250 GENERAL PHARMACY W/ HCPCS (ALT 636 FOR OP/ED): Performed by: INTERNAL MEDICINE

## 2025-01-31 PROCEDURE — 3600000018 HC OR TIME - INITIAL BASE CHARGE - PROCEDURE LEVEL SIX: Performed by: ORTHOPAEDIC SURGERY

## 2025-01-31 PROCEDURE — 1100000001 HC PRIVATE ROOM DAILY

## 2025-01-31 PROCEDURE — 3700000001 HC GENERAL ANESTHESIA TIME - INITIAL BASE CHARGE: Performed by: ORTHOPAEDIC SURGERY

## 2025-01-31 PROCEDURE — 63052 LAM FACETC/FRMT ARTHRD LUM 1: CPT | Performed by: ORTHOPAEDIC SURGERY

## 2025-01-31 PROCEDURE — 0ST20ZZ RESECTION OF LUMBAR VERTEBRAL DISC, OPEN APPROACH: ICD-10-PCS | Performed by: ORTHOPAEDIC SURGERY

## 2025-01-31 PROCEDURE — 76000 FLUOROSCOPY <1 HR PHYS/QHP: CPT

## 2025-01-31 PROCEDURE — 22853 INSJ BIOMECHANICAL DEVICE: CPT | Performed by: ORTHOPAEDIC SURGERY

## 2025-01-31 DEVICE — SCREW, MAS 6.5 X 50 CC: Type: IMPLANTABLE DEVICE | Site: SPINE LUMBAR | Status: FUNCTIONAL

## 2025-01-31 DEVICE — IMPLANTABLE DEVICE: Type: IMPLANTABLE DEVICE | Site: SPINE LUMBAR | Status: FUNCTIONAL

## 2025-01-31 DEVICE — SET SCREW, 5.5, TI NS BRK OFF: Type: IMPLANTABLE DEVICE | Site: SPINE LUMBAR | Status: FUNCTIONAL

## 2025-01-31 DEVICE — SCREW, MAS 6.5 X 45 CC SOLERA: Type: IMPLANTABLE DEVICE | Site: SPINE LUMBAR | Status: FUNCTIONAL

## 2025-01-31 DEVICE — BONE, PUTTY DBX  10CC DE-MINERAL ASEPTIC: Type: IMPLANTABLE DEVICE | Site: SPINE LUMBAR | Status: FUNCTIONAL

## 2025-01-31 DEVICE — ROD, 5.5 X 60 CVD TI SOLERA: Type: IMPLANTABLE DEVICE | Site: SPINE LUMBAR | Status: FUNCTIONAL

## 2025-01-31 DEVICE — BONE, CHIP, CANCELLOUS, FREEZE DRIED, ASEPTIC, 1.7-10 MM, 60 CC: Type: IMPLANTABLE DEVICE | Site: SPINE LUMBAR | Status: FUNCTIONAL

## 2025-01-31 DEVICE — BONE CHIPS,  CANCELL 30CC 1.7-10MM: Type: IMPLANTABLE DEVICE | Site: SPINE LUMBAR | Status: FUNCTIONAL

## 2025-01-31 RX ORDER — GABAPENTIN 300 MG/1
300 CAPSULE ORAL 3 TIMES DAILY
Status: DISCONTINUED | OUTPATIENT
Start: 2025-01-31 | End: 2025-02-03 | Stop reason: HOSPADM

## 2025-01-31 RX ORDER — ONDANSETRON HYDROCHLORIDE 2 MG/ML
4 INJECTION, SOLUTION INTRAVENOUS EVERY 8 HOURS PRN
Status: DISCONTINUED | OUTPATIENT
Start: 2025-01-31 | End: 2025-02-03 | Stop reason: HOSPADM

## 2025-01-31 RX ORDER — LIDOCAINE HYDROCHLORIDE 10 MG/ML
0.1 INJECTION, SOLUTION EPIDURAL; INFILTRATION; INTRACAUDAL; PERINEURAL ONCE
Status: DISCONTINUED | OUTPATIENT
Start: 2025-01-31 | End: 2025-01-31 | Stop reason: HOSPADM

## 2025-01-31 RX ORDER — ACETAMINOPHEN 325 MG/1
975 TABLET ORAL EVERY 8 HOURS
Status: DISCONTINUED | OUTPATIENT
Start: 2025-01-31 | End: 2025-02-03 | Stop reason: HOSPADM

## 2025-01-31 RX ORDER — ONDANSETRON HYDROCHLORIDE 2 MG/ML
INJECTION, SOLUTION INTRAVENOUS AS NEEDED
Status: DISCONTINUED | OUTPATIENT
Start: 2025-01-31 | End: 2025-01-31

## 2025-01-31 RX ORDER — MIDAZOLAM HYDROCHLORIDE 1 MG/ML
INJECTION INTRAMUSCULAR; INTRAVENOUS AS NEEDED
Status: DISCONTINUED | OUTPATIENT
Start: 2025-01-31 | End: 2025-01-31

## 2025-01-31 RX ORDER — METHOCARBAMOL 100 MG/ML
INJECTION, SOLUTION INTRAMUSCULAR; INTRAVENOUS AS NEEDED
Status: DISCONTINUED | OUTPATIENT
Start: 2025-01-31 | End: 2025-01-31

## 2025-01-31 RX ORDER — LIDOCAINE HCL/PF 100 MG/5ML
SYRINGE (ML) INTRAVENOUS AS NEEDED
Status: DISCONTINUED | OUTPATIENT
Start: 2025-01-31 | End: 2025-01-31

## 2025-01-31 RX ORDER — BISACODYL 5 MG
10 TABLET, DELAYED RELEASE (ENTERIC COATED) ORAL DAILY PRN
Status: DISCONTINUED | OUTPATIENT
Start: 2025-01-31 | End: 2025-02-03 | Stop reason: HOSPADM

## 2025-01-31 RX ORDER — POLYETHYLENE GLYCOL 3350 17 G/17G
17 POWDER, FOR SOLUTION ORAL DAILY
Status: DISCONTINUED | OUTPATIENT
Start: 2025-01-31 | End: 2025-02-03 | Stop reason: HOSPADM

## 2025-01-31 RX ORDER — PROPOFOL 10 MG/ML
INJECTION, EMULSION INTRAVENOUS AS NEEDED
Status: DISCONTINUED | OUTPATIENT
Start: 2025-01-31 | End: 2025-01-31

## 2025-01-31 RX ORDER — SERTRALINE HYDROCHLORIDE 50 MG/1
50 TABLET, FILM COATED ORAL DAILY
Status: DISCONTINUED | OUTPATIENT
Start: 2025-01-31 | End: 2025-02-03 | Stop reason: HOSPADM

## 2025-01-31 RX ORDER — ONDANSETRON 4 MG/1
4 TABLET, ORALLY DISINTEGRATING ORAL EVERY 8 HOURS PRN
Status: DISCONTINUED | OUTPATIENT
Start: 2025-01-31 | End: 2025-02-03 | Stop reason: HOSPADM

## 2025-01-31 RX ORDER — CEFAZOLIN 1 G/1
INJECTION, POWDER, FOR SOLUTION INTRAVENOUS AS NEEDED
Status: DISCONTINUED | OUTPATIENT
Start: 2025-01-31 | End: 2025-01-31

## 2025-01-31 RX ORDER — MEPERIDINE HYDROCHLORIDE 25 MG/ML
12.5 INJECTION INTRAMUSCULAR; INTRAVENOUS; SUBCUTANEOUS EVERY 10 MIN PRN
Status: DISCONTINUED | OUTPATIENT
Start: 2025-01-31 | End: 2025-01-31 | Stop reason: HOSPADM

## 2025-01-31 RX ORDER — OXYCODONE HYDROCHLORIDE 5 MG/1
5 TABLET ORAL EVERY 4 HOURS PRN
Status: DISCONTINUED | OUTPATIENT
Start: 2025-01-31 | End: 2025-02-03 | Stop reason: HOSPADM

## 2025-01-31 RX ORDER — ROCURONIUM BROMIDE 10 MG/ML
INJECTION, SOLUTION INTRAVENOUS AS NEEDED
Status: DISCONTINUED | OUTPATIENT
Start: 2025-01-31 | End: 2025-01-31

## 2025-01-31 RX ORDER — PROCHLORPERAZINE 25 MG/1
25 SUPPOSITORY RECTAL EVERY 12 HOURS PRN
Status: DISCONTINUED | OUTPATIENT
Start: 2025-01-31 | End: 2025-01-31

## 2025-01-31 RX ORDER — PHENYLEPHRINE HCL IN 0.9% NACL 1 MG/10 ML
SYRINGE (ML) INTRAVENOUS AS NEEDED
Status: DISCONTINUED | OUTPATIENT
Start: 2025-01-31 | End: 2025-01-31

## 2025-01-31 RX ORDER — FENTANYL CITRATE 50 UG/ML
INJECTION, SOLUTION INTRAMUSCULAR; INTRAVENOUS AS NEEDED
Status: DISCONTINUED | OUTPATIENT
Start: 2025-01-31 | End: 2025-01-31

## 2025-01-31 RX ORDER — BACITRACIN 500 [USP'U]/G
OINTMENT TOPICAL AS NEEDED
Status: DISCONTINUED | OUTPATIENT
Start: 2025-01-31 | End: 2025-01-31 | Stop reason: HOSPADM

## 2025-01-31 RX ORDER — METHOCARBAMOL 500 MG/1
1000 TABLET, FILM COATED ORAL 3 TIMES DAILY
Status: DISCONTINUED | OUTPATIENT
Start: 2025-01-31 | End: 2025-02-03 | Stop reason: HOSPADM

## 2025-01-31 RX ORDER — ONDANSETRON HYDROCHLORIDE 2 MG/ML
4 INJECTION, SOLUTION INTRAVENOUS ONCE AS NEEDED
Status: DISCONTINUED | OUTPATIENT
Start: 2025-01-31 | End: 2025-01-31 | Stop reason: HOSPADM

## 2025-01-31 RX ORDER — PROCHLORPERAZINE MALEATE 10 MG
10 TABLET ORAL EVERY 6 HOURS PRN
Status: DISCONTINUED | OUTPATIENT
Start: 2025-01-31 | End: 2025-02-03 | Stop reason: HOSPADM

## 2025-01-31 RX ORDER — CEFAZOLIN SODIUM 2 G/100ML
2 INJECTION, SOLUTION INTRAVENOUS EVERY 8 HOURS
Status: COMPLETED | OUTPATIENT
Start: 2025-01-31 | End: 2025-02-02

## 2025-01-31 RX ORDER — OXYCODONE HYDROCHLORIDE 5 MG/1
10 TABLET ORAL EVERY 4 HOURS PRN
Status: DISCONTINUED | OUTPATIENT
Start: 2025-01-31 | End: 2025-02-03 | Stop reason: HOSPADM

## 2025-01-31 RX ORDER — OXYCODONE HYDROCHLORIDE 5 MG/1
5 TABLET ORAL EVERY 4 HOURS PRN
Status: DISCONTINUED | OUTPATIENT
Start: 2025-01-31 | End: 2025-01-31 | Stop reason: HOSPADM

## 2025-01-31 RX ORDER — NALOXONE HYDROCHLORIDE 0.4 MG/ML
0.2 INJECTION, SOLUTION INTRAMUSCULAR; INTRAVENOUS; SUBCUTANEOUS EVERY 5 MIN PRN
Status: DISCONTINUED | OUTPATIENT
Start: 2025-01-31 | End: 2025-02-03 | Stop reason: HOSPADM

## 2025-01-31 RX ORDER — PROCHLORPERAZINE EDISYLATE 5 MG/ML
10 INJECTION INTRAMUSCULAR; INTRAVENOUS EVERY 6 HOURS PRN
Status: DISCONTINUED | OUTPATIENT
Start: 2025-01-31 | End: 2025-02-03 | Stop reason: HOSPADM

## 2025-01-31 RX ORDER — DEXTROSE 50 % IN WATER (D50W) INTRAVENOUS SYRINGE
12.5
Status: DISCONTINUED | OUTPATIENT
Start: 2025-01-31 | End: 2025-02-03 | Stop reason: HOSPADM

## 2025-01-31 RX ORDER — SODIUM CHLORIDE, SODIUM LACTATE, POTASSIUM CHLORIDE, CALCIUM CHLORIDE 600; 310; 30; 20 MG/100ML; MG/100ML; MG/100ML; MG/100ML
100 INJECTION, SOLUTION INTRAVENOUS CONTINUOUS
Status: ACTIVE | OUTPATIENT
Start: 2025-01-31 | End: 2025-02-01

## 2025-01-31 RX ORDER — SENNOSIDES 8.6 MG/1
2 TABLET ORAL 2 TIMES DAILY
Status: DISCONTINUED | OUTPATIENT
Start: 2025-01-31 | End: 2025-02-03 | Stop reason: HOSPADM

## 2025-01-31 RX ORDER — SODIUM CHLORIDE, SODIUM LACTATE, POTASSIUM CHLORIDE, CALCIUM CHLORIDE 600; 310; 30; 20 MG/100ML; MG/100ML; MG/100ML; MG/100ML
INJECTION, SOLUTION INTRAVENOUS CONTINUOUS PRN
Status: DISCONTINUED | OUTPATIENT
Start: 2025-01-31 | End: 2025-01-31

## 2025-01-31 RX ORDER — DEXAMETHASONE SODIUM PHOSPHATE 10 MG/ML
10 INJECTION INTRAMUSCULAR; INTRAVENOUS EVERY 8 HOURS SCHEDULED
Status: COMPLETED | OUTPATIENT
Start: 2025-01-31 | End: 2025-02-01

## 2025-01-31 RX ADMIN — ACETAMINOPHEN 975 MG: 325 TABLET, FILM COATED ORAL at 21:09

## 2025-01-31 RX ADMIN — Medication 100 MCG: at 13:17

## 2025-01-31 RX ADMIN — ROCURONIUM BROMIDE 20 MG: 10 INJECTION, SOLUTION INTRAVENOUS at 14:19

## 2025-01-31 RX ADMIN — ROCURONIUM BROMIDE 20 MG: 10 INJECTION, SOLUTION INTRAVENOUS at 13:04

## 2025-01-31 RX ADMIN — FENTANYL CITRATE 50 MCG: 50 INJECTION, SOLUTION INTRAMUSCULAR; INTRAVENOUS at 15:59

## 2025-01-31 RX ADMIN — Medication 200 MCG: at 14:31

## 2025-01-31 RX ADMIN — POVIDONE-IODINE 1 APPLICATION: 5 SOLUTION TOPICAL at 11:42

## 2025-01-31 RX ADMIN — MIDAZOLAM HYDROCHLORIDE 2 MG: 1 INJECTION, SOLUTION INTRAMUSCULAR; INTRAVENOUS at 12:17

## 2025-01-31 RX ADMIN — SUGAMMADEX 200 MG: 100 INJECTION, SOLUTION INTRAVENOUS at 15:59

## 2025-01-31 RX ADMIN — CEFAZOLIN 2 G: 1 INJECTION, POWDER, FOR SOLUTION INTRAMUSCULAR; INTRAVENOUS at 12:30

## 2025-01-31 RX ADMIN — METHOCARBAMOL 500 MG: 1000 INJECTION, SOLUTION INTRAMUSCULAR; INTRAVENOUS at 13:24

## 2025-01-31 RX ADMIN — HYDROMORPHONE HYDROCHLORIDE 0.5 MG: 1 INJECTION, SOLUTION INTRAMUSCULAR; INTRAVENOUS; SUBCUTANEOUS at 16:45

## 2025-01-31 RX ADMIN — Medication 10 L/MIN: at 16:08

## 2025-01-31 RX ADMIN — FENTANYL CITRATE 100 MCG: 50 INJECTION, SOLUTION INTRAMUSCULAR; INTRAVENOUS at 12:25

## 2025-01-31 RX ADMIN — Medication 200 MCG: at 15:01

## 2025-01-31 RX ADMIN — GABAPENTIN 300 MG: 300 CAPSULE ORAL at 21:01

## 2025-01-31 RX ADMIN — DEXAMETHASONE SODIUM PHOSPHATE 10 MG: 10 INJECTION INTRAMUSCULAR; INTRAVENOUS at 23:08

## 2025-01-31 RX ADMIN — FENTANYL CITRATE 50 MCG: 50 INJECTION, SOLUTION INTRAMUSCULAR; INTRAVENOUS at 15:12

## 2025-01-31 RX ADMIN — ONDANSETRON 4 MG: 2 INJECTION, SOLUTION INTRAMUSCULAR; INTRAVENOUS at 12:41

## 2025-01-31 RX ADMIN — Medication 100 MCG: at 13:38

## 2025-01-31 RX ADMIN — LIDOCAINE HYDROCHLORIDE 80 MG: 20 INJECTION, SOLUTION INTRAVENOUS at 12:25

## 2025-01-31 RX ADMIN — GLYCOPYRROLATE 0.2 MCG: 0.2 INJECTION, SOLUTION INTRAMUSCULAR; INTRAVENOUS at 12:17

## 2025-01-31 RX ADMIN — METHOCARBAMOL 500 MG: 1000 INJECTION, SOLUTION INTRAMUSCULAR; INTRAVENOUS at 14:16

## 2025-01-31 RX ADMIN — Medication 100 MCG: at 14:03

## 2025-01-31 RX ADMIN — ONDANSETRON 4 MG: 2 INJECTION, SOLUTION INTRAMUSCULAR; INTRAVENOUS at 15:59

## 2025-01-31 RX ADMIN — CEFAZOLIN SODIUM 2 G: 2 INJECTION, SOLUTION INTRAVENOUS at 21:00

## 2025-01-31 RX ADMIN — SENNOSIDES 17.2 MG: 8.6 TABLET, FILM COATED ORAL at 21:01

## 2025-01-31 RX ADMIN — Medication 200 MCG: at 14:07

## 2025-01-31 RX ADMIN — HYDROMORPHONE HYDROCHLORIDE 0.5 MG: 1 INJECTION, SOLUTION INTRAMUSCULAR; INTRAVENOUS; SUBCUTANEOUS at 17:06

## 2025-01-31 RX ADMIN — SODIUM CHLORIDE: 9 INJECTION, SOLUTION INTRAVENOUS at 14:17

## 2025-01-31 RX ADMIN — Medication 100 MCG: at 13:19

## 2025-01-31 RX ADMIN — DEXAMETHASONE SODIUM PHOSPHATE 10 MG: 4 INJECTION, SOLUTION INTRAMUSCULAR; INTRAVENOUS at 12:41

## 2025-01-31 RX ADMIN — METHOCARBAMOL 1000 MG: 500 TABLET ORAL at 21:01

## 2025-01-31 RX ADMIN — PROPOFOL 200 MG: 10 INJECTION, EMULSION INTRAVENOUS at 12:25

## 2025-01-31 RX ADMIN — Medication 100 MCG: at 13:51

## 2025-01-31 RX ADMIN — ROCURONIUM BROMIDE 60 MG: 10 INJECTION, SOLUTION INTRAVENOUS at 12:25

## 2025-01-31 RX ADMIN — SODIUM CHLORIDE, POTASSIUM CHLORIDE, SODIUM LACTATE AND CALCIUM CHLORIDE 100 ML/HR: 600; 310; 30; 20 INJECTION, SOLUTION INTRAVENOUS at 19:00

## 2025-01-31 RX ADMIN — Medication 200 MCG: at 14:44

## 2025-01-31 RX ADMIN — SODIUM CHLORIDE, POTASSIUM CHLORIDE, SODIUM LACTATE AND CALCIUM CHLORIDE: 600; 310; 30; 20 INJECTION, SOLUTION INTRAVENOUS at 12:17

## 2025-01-31 SDOH — SOCIAL STABILITY: SOCIAL INSECURITY: WITHIN THE LAST YEAR, HAVE YOU BEEN AFRAID OF YOUR PARTNER OR EX-PARTNER?: PATIENT UNABLE TO ANSWER

## 2025-01-31 SDOH — SOCIAL STABILITY: SOCIAL INSECURITY: ARE THERE ANY APPARENT SIGNS OF INJURIES/BEHAVIORS THAT COULD BE RELATED TO ABUSE/NEGLECT?: UNABLE TO ASSESS

## 2025-01-31 SDOH — SOCIAL STABILITY: SOCIAL INSECURITY
WITHIN THE LAST YEAR, HAVE YOU BEEN RAPED OR FORCED TO HAVE ANY KIND OF SEXUAL ACTIVITY BY YOUR PARTNER OR EX-PARTNER?: PATIENT UNABLE TO ANSWER

## 2025-01-31 SDOH — SOCIAL STABILITY: SOCIAL INSECURITY: WERE YOU ABLE TO COMPLETE ALL THE BEHAVIORAL HEALTH SCREENINGS?: YES

## 2025-01-31 SDOH — SOCIAL STABILITY: SOCIAL INSECURITY: HAVE YOU HAD THOUGHTS OF HARMING ANYONE ELSE?: NO

## 2025-01-31 SDOH — ECONOMIC STABILITY: FOOD INSECURITY
WITHIN THE PAST 12 MONTHS, THE FOOD YOU BOUGHT JUST DIDN'T LAST AND YOU DIDN'T HAVE MONEY TO GET MORE.: PATIENT UNABLE TO ANSWER

## 2025-01-31 SDOH — SOCIAL STABILITY: SOCIAL INSECURITY: ARE YOU OR HAVE YOU BEEN THREATENED OR ABUSED PHYSICALLY, EMOTIONALLY, OR SEXUALLY BY ANYONE?: NO

## 2025-01-31 SDOH — SOCIAL STABILITY: SOCIAL INSECURITY: DO YOU FEEL UNSAFE GOING BACK TO THE PLACE WHERE YOU ARE LIVING?: NO

## 2025-01-31 SDOH — ECONOMIC STABILITY: HOUSING INSECURITY: DO YOU FEEL UNSAFE GOING BACK TO THE PLACE WHERE YOU LIVE?: NO

## 2025-01-31 SDOH — SOCIAL STABILITY: SOCIAL INSECURITY: HAVE YOU HAD ANY THOUGHTS OF HARMING ANYONE ELSE?: NO

## 2025-01-31 SDOH — ECONOMIC STABILITY: FOOD INSECURITY
WITHIN THE PAST 12 MONTHS, YOU WORRIED THAT YOUR FOOD WOULD RUN OUT BEFORE YOU GOT THE MONEY TO BUY MORE.: PATIENT UNABLE TO ANSWER

## 2025-01-31 SDOH — SOCIAL STABILITY: SOCIAL INSECURITY
WITHIN THE LAST YEAR, HAVE YOU BEEN KICKED, HIT, SLAPPED, OR OTHERWISE PHYSICALLY HURT BY YOUR PARTNER OR EX-PARTNER?: PATIENT UNABLE TO ANSWER

## 2025-01-31 SDOH — SOCIAL STABILITY: SOCIAL INSECURITY: HAS ANYONE EVER THREATENED TO HURT YOUR FAMILY OR YOUR PETS?: NO

## 2025-01-31 SDOH — SOCIAL STABILITY: SOCIAL INSECURITY: DO YOU FEEL ANYONE HAS EXPLOITED OR TAKEN ADVANTAGE OF YOU FINANCIALLY OR OF YOUR PERSONAL PROPERTY?: NO

## 2025-01-31 SDOH — SOCIAL STABILITY: SOCIAL INSECURITY: ABUSE: ADULT

## 2025-01-31 SDOH — SOCIAL STABILITY: SOCIAL INSECURITY
WITHIN THE LAST YEAR, HAVE YOU BEEN HUMILIATED OR EMOTIONALLY ABUSED IN OTHER WAYS BY YOUR PARTNER OR EX-PARTNER?: PATIENT UNABLE TO ANSWER

## 2025-01-31 SDOH — SOCIAL STABILITY: SOCIAL INSECURITY: DOES ANYONE TRY TO KEEP YOU FROM HAVING/CONTACTING OTHER FRIENDS OR DOING THINGS OUTSIDE YOUR HOME?: NO

## 2025-01-31 SDOH — ECONOMIC STABILITY: INCOME INSECURITY
IN THE PAST 12 MONTHS HAS THE ELECTRIC, GAS, OIL, OR WATER COMPANY THREATENED TO SHUT OFF SERVICES IN YOUR HOME?: PATIENT UNABLE TO ANSWER

## 2025-01-31 ASSESSMENT — ACTIVITIES OF DAILY LIVING (ADL)
LACK_OF_TRANSPORTATION: NO
PATIENT'S MEMORY ADEQUATE TO SAFELY COMPLETE DAILY ACTIVITIES?: YES
GROOMING: INDEPENDENT
BATHING: INDEPENDENT
HEARING - RIGHT EAR: FUNCTIONAL
JUDGMENT_ADEQUATE_SAFELY_COMPLETE_DAILY_ACTIVITIES: YES
HEARING - LEFT EAR: FUNCTIONAL
TOILETING: INDEPENDENT
ADEQUATE_TO_COMPLETE_ADL: YES
WALKS IN HOME: INDEPENDENT
FEEDING YOURSELF: INDEPENDENT
DRESSING YOURSELF: INDEPENDENT

## 2025-01-31 ASSESSMENT — PAIN - FUNCTIONAL ASSESSMENT
PAIN_FUNCTIONAL_ASSESSMENT: UNABLE TO SELF-REPORT
PAIN_FUNCTIONAL_ASSESSMENT: 0-10
PAIN_FUNCTIONAL_ASSESSMENT: UNABLE TO SELF-REPORT
PAIN_FUNCTIONAL_ASSESSMENT: 0-10
PAIN_FUNCTIONAL_ASSESSMENT: 0-10
PAIN_FUNCTIONAL_ASSESSMENT: UNABLE TO SELF-REPORT
PAIN_FUNCTIONAL_ASSESSMENT: 0-10

## 2025-01-31 ASSESSMENT — LIFESTYLE VARIABLES
AUDIT-C TOTAL SCORE: 2
HOW MANY STANDARD DRINKS CONTAINING ALCOHOL DO YOU HAVE ON A TYPICAL DAY: 1 OR 2
SKIP TO QUESTIONS 9-10: 0
HOW OFTEN DO YOU HAVE 6 OR MORE DRINKS ON ONE OCCASION: LESS THAN MONTHLY
AUDIT-C TOTAL SCORE: 2
HOW OFTEN DO YOU HAVE A DRINK CONTAINING ALCOHOL: MONTHLY OR LESS

## 2025-01-31 ASSESSMENT — COGNITIVE AND FUNCTIONAL STATUS - GENERAL
PERSONAL GROOMING: A LITTLE
CLIMB 3 TO 5 STEPS WITH RAILING: A LITTLE
TURNING FROM BACK TO SIDE WHILE IN FLAT BAD: A LITTLE
PATIENT BASELINE BEDBOUND: NO
STANDING UP FROM CHAIR USING ARMS: A LITTLE
MOVING FROM LYING ON BACK TO SITTING ON SIDE OF FLAT BED WITH BEDRAILS: A LITTLE
DAILY ACTIVITIY SCORE: 18
DRESSING REGULAR LOWER BODY CLOTHING: A LITTLE
EATING MEALS: A LITTLE
TOILETING: A LITTLE
DRESSING REGULAR UPPER BODY CLOTHING: A LITTLE
MOBILITY SCORE: 18
MOVING TO AND FROM BED TO CHAIR: A LITTLE
WALKING IN HOSPITAL ROOM: A LITTLE
HELP NEEDED FOR BATHING: A LITTLE

## 2025-01-31 ASSESSMENT — PAIN SCALES - GENERAL
PAINLEVEL_OUTOF10: 0 - NO PAIN
PAINLEVEL_OUTOF10: 9
PAIN_LEVEL: 0
PAINLEVEL_OUTOF10: 0 - NO PAIN
PAINLEVEL_OUTOF10: 10 - WORST POSSIBLE PAIN
PAINLEVEL_OUTOF10: 0 - NO PAIN

## 2025-01-31 ASSESSMENT — COLUMBIA-SUICIDE SEVERITY RATING SCALE - C-SSRS
2. HAVE YOU ACTUALLY HAD ANY THOUGHTS OF KILLING YOURSELF?: NO
6. HAVE YOU EVER DONE ANYTHING, STARTED TO DO ANYTHING, OR PREPARED TO DO ANYTHING TO END YOUR LIFE?: NO
1. IN THE PAST MONTH, HAVE YOU WISHED YOU WERE DEAD OR WISHED YOU COULD GO TO SLEEP AND NOT WAKE UP?: NO

## 2025-01-31 ASSESSMENT — PAIN DESCRIPTION - LOCATION
LOCATION: BACK
LOCATION: BACK

## 2025-01-31 NOTE — ANESTHESIA PREPROCEDURE EVALUATION
Patient: Alexia Gonzáles    Procedure Information       Date/Time: 01/31/25 1200    Procedure: L3-L5 Lumbar Spine Decompression and Fusion; L4-L5 Transforaminal Lumbar Interbody Fusion (Spine Lumbar)    Location: [facility] A OR 07 / [facility] A OR    Surgeons: Kwaku Gallegos MD            Relevant Problems   Anesthesia (within normal limits)      Cardiac   (+) Essential hypertension   (+) Hyperlipidemia      Pulmonary  Smoking 1ppd until 3weeks ago      Neuro   (+) Lumbago with sciatica, left side   (+) Lumbar radiculopathy      GI (within normal limits)      /Renal (within normal limits)      Liver (within normal limits)      Endocrine (within normal limits)      Hematology (within normal limits)      Musculoskeletal   (+) Chronic low back pain   (+) Degenerative lumbar spinal stenosis   (+) Spinal stenosis, lumbar region with neurogenic claudication       Clinical information reviewed:    Allergies                NPO Detail:  No data recorded     Physical Exam    Airway  Mallampati: II     Cardiovascular    Dental    Pulmonary    Abdominal            Anesthesia Plan    History of general anesthesia?: yes  History of complications of general anesthesia?: no    ASA 3     general     intravenous induction   Anesthetic plan and risks discussed with patient.

## 2025-01-31 NOTE — ANESTHESIA POSTPROCEDURE EVALUATION
Patient: Alexia Gonzáles    Procedure Summary       Date: 01/31/25 Room / Location: [facility] A OR 07 / [facility] A OR    Anesthesia Start: 1217 Anesthesia Stop: 1613    Procedure: L3-L5 Lumbar Spine Decompression and Fusion; L4-L5 Transforaminal Lumbar Interbody Fusion (Spine Lumbar) Diagnosis:       Spinal stenosis, lumbar region with neurogenic claudication      (Spinal stenosis, lumbar region with neurogenic claudication [M48.062])    Surgeons: Kwaku Gallegos MD Responsible Provider: Titus Barrera MD    Anesthesia Type: general ASA Status: 3            Anesthesia Type: general    Vitals Value Taken Time   /63 01/31/25 1631   Temp 36.1 °C (97 °F) 01/31/25 1608   Pulse 80 01/31/25 1632   Resp 21 01/31/25 1615   SpO2 94 % 01/31/25 1632   Vitals shown include unfiled device data.    Anesthesia Post Evaluation    Patient location during evaluation: bedside  Patient participation: complete - patient cannot participate  Level of consciousness: awake  Pain score: 0  Pain management: adequate  Airway patency: patent  Cardiovascular status: acceptable and hemodynamically stable  Respiratory status: acceptable and nonlabored ventilation  Hydration status: acceptable  Postoperative Nausea and Vomiting: none        No notable events documented.

## 2025-01-31 NOTE — ANESTHESIA PROCEDURE NOTES
Airway  Date/Time: 1/31/2025 12:30 PM  Urgency: elective    Airway not difficult    Staffing  Performed: CRNA   Authorized by: Titus Barrera MD    Performed by: MARISOL Horta-TITI  Patient location during procedure: OR    Indications and Patient Condition  Indications for airway management: anesthesia and airway protection  Spontaneous Ventilation: absent  Sedation level: deep  Preoxygenated: yes  Patient position: sniffing  MILS maintained throughout  Mask difficulty assessment: 1 - vent by mask    Final Airway Details  Final airway type: endotracheal airway      Successful airway: ETT  Cuffed: yes   Successful intubation technique: direct laryngoscopy  Facilitating devices/methods: intubating stylet  Endotracheal tube insertion site: oral  Blade: Jacinto  Blade size: #3  ETT size (mm): 7.0  Cormack-Lehane Classification: grade I - full view of glottis  Placement verified by: chest auscultation and capnometry   Cuff volume (mL): 7  Measured from: lips  ETT to lips (cm): 21  Number of attempts at approach: 1    Additional Comments  Smooth IV induction, easy mask ventilations, atraumatic DVL/intubation, teeth/lips intact.

## 2025-01-31 NOTE — PROGRESS NOTES
"Orthopaedic Surgery Progress Note    Subjective: Evaluated in immediate postoperative period. Pain well controlled considering recent surgery. Awaking from anesthesia.    Objective:  /81   Pulse 64   Temp 36.4 °C (97.5 °F) (Temporal)   Resp 15   Ht 1.651 m (5' 5\")   Wt 77.6 kg (171 lb 1.2 oz)   SpO2 96%   BMI 28.47 kg/m²     Gen: arousable, NAD, appropriately conversational  Cardiac: RRR to peripheral palpation  Resp: nonlabored on RA  GI: soft, nondistended    MSK:  Spine Exam:  Drain in place holding suction, sanguineous output    L1: SILT       L2: SILT      Hip flexors 5/5 Left; 5/5 Right  L3: SILT      Knee extension 5/5 Left; 5/5 Right  L4: SILT      Tib Ant. (Dorsiflexion) 5/5 Left; 5/5 Right  L5: SILT      EHL 5/5 Left; 5/5 Right  S1: SILT      Plantarflexion 5/5 Left; 5/5 Right      Assessment/Plan: 64 y.o. female PMHx lumbar spinal stenosis s/p L3-5 posterior decompression and fusion with L4/5 TLIF on 1/31/2025 with Dr. Gallegos.      Plan:  Plan:  - Weight-bearing status: Activity as tolerated; no excessive twisting, turning, or bending    - Feeding: Regular diet as tolerated, bowel regimen  - Analgesia: Tylenol 650mg, oxy 5mg/10mg, Dilaudid 0.4mg, Robaxin prn, dexamethasone 10 mg IV q8h for 3 doses   - Volume: mIV @ at  cc/hr, HLIVF when tolerating PO, monitor BMP  - OT/PT: Consulted  - Respiratory: Encourage IS, maintain O2 sat >92%  - Infection: Mercedes-operative Ancef for 48 hours, afebrile   - Lines: Maintain PIVx2 while inpatient  - Drains: Maintain HV drain, monitor and record output q8 hrs  - Melvin: None   - Embolic ppx: SCDs, early mobility    - Transfusion: Trend CBC, no indication for transfusion  - Cardiac: No issues  - Glycemic: POC while on steroids   - C/w home medications    Dispo pending PT, pain control, drain removal    Plan was discussed with attending Dr. El Baldwin MD  Orthopaedic Surgery PGY-2  [facility]  Pager: [phone]  Available by " Epic Chat

## 2025-01-31 NOTE — DISCHARGE INSTRUCTIONS
Orthopaedic Spine Discharge Instructions:  Activity as tolerated PROGRESSIVE WALKING AT LEAST 2X/DAY.    May shower WHEN NO DRAINAGE FROM INCISIONAL AREA FOR 48 HOURS.     May drive when no longer taking pain medication.  Please wait to be cleared by your orthopaedic surgeon before driving.    No pushing, pulling, or lifting objects greater than 10 pounds until follow up visit.     Weight-bearing Instructions: weight-bearing as tolerated.     Other activity instructions: No extreme bending, twisting, or lifting.    Do not remove Dermabond dressing from skin, it will fall off on its own.  You may cover your incision with dry gauze bandage as needed for drainage.    Do NOT take any anti-inflammatory medications after surgery as this prevents bone healing. Do NOT take any ibuprofen, motrin, aleve, etc. Tylenol is safe to take after surgery and is encouraged.    Go immediately to the emergency room for any new arm/leg weakness or loss of bowel or bladder control.    Call [phone], option #2, to schedule a follow up appointment if you do not have one already

## 2025-01-31 NOTE — OP NOTE
L3-L5 Lumbar Spine Decompression and Fusion; L4-L5 Transforaminal Lumbar Interbody Fusion Operative Note     Date: 2025  OR Location: [facility] A OR    Name: Alexia Gonzáles, : 1960, Age: 64 y.o., MRN: [id number], Sex: female    Diagnosis  Pre-op Diagnosis      * Spinal stenosis, lumbar region with neurogenic claudication [M48.062] Post-op Diagnosis     * Spinal stenosis, lumbar region with neurogenic claudication [M48.062]     Procedures    Posterior decompression L3-5  Posterior lateral fusion L3-5 with pedicle screw instrumentation  Transforaminal lumbar interbody fusion L4-5   surgical navigation guidance        Surgeons      * Kwaku Gallegos - Primary    Resident/Fellow/Other Assistant:  Surgeons and Role:     * Joe Solano PA-C - Assisting     * Daniel Baldwin MD - Assisting    Staff:   Circulator: Veronica Powellub Person: Piyush  Circulator: Allie Zuniga Scrub: Fiorella    Anesthesia Staff: Anesthesiologist: Titus Barrera MD  CRNA: Braeden Snell, MARISOL-CNP, APRN-CRNA; Cheyenne Macias APRN-CRNA  C-AA: ROULA Watkins    Procedure Summary  Anesthesia: General  ASA: III  Estimated Blood Loss: 40 mL  Intra-op Medications:   Administrations occurring from 1200 to 1530 on 25:   Medication Name Total Dose   thrombin-bovine (JMI) 5,000 unit topical solution 10,000 Units   gelatin absorbable (Gelfoam) 100 sponge 1 each   bacitracin ointment 1 Application   polymyxin B 500,000 Units in sodium chloride 0.9% 1,000 mL irrigation 500,000 Units   ceFAZolin (Ancef) vial 1 g 2 g   dexAMETHasone (Decadron) injection 4 mg/mL 10 mg   fentaNYL (Sublimaze) injection 50 mcg/mL 150 mcg   glycopyrrolate PF (Robinul) injection 0.2 mcg   LR infusion Cannot be calculated   lidocaine (cardiac) injection 2% prefilled syringe 80 mg   methocarbamol (Robaxin) injection 1,000 mg   midazolam PF (Versed) injection 1 mg/mL 2 mg   ondansetron (Zofran) 2 mg/mL injection 4 mg   phenylephrine 100 mcg/mL syringe 10 mL  (prefilled) 1,300 mcg   propofol (Diprivan) injection 10 mg/mL 200 mg   rocuronium (ZeMuron) 50 mg/5 mL injection 100 mg   NaCl 0.9 % bolus Cannot be calculated              Anesthesia Record               Intraprocedure I/O Totals          Intake    NaCl 0.9 % bolus 1500.00 mL    LR infusion 1000.00 mL    Total Intake 2500 mL          Specimen: No specimens collected              Drains and/or Catheters:   Closed/Suction Drain Midline Back Accordion 10 Fr. (Active)   Site Description Other (Comment) 01/31/25 1608   Dressing Status Clean;Dry 01/31/25 1608   Drainage Appearance Bloody 01/31/25 1608   Status Other (Comment) 01/31/25 1608       Tourniquet Times:         Implants:  Implants       Type Name Action Serial No.      Implant BONE, PUTTY DBX  10CC DE-MINERAL ASEPTIC - [id number] - [id number] Implanted [id number]     Graft BONE CHIPS,  CANCELL 30CC 1.7-10MM - [id number] - [id number] Implanted [id number]     Graft BONE, CHIP, CANCELLOUS, FREEZE DRIED, ASEPTIC, 1.7-10 MM, 60 CC - [id number] - [id number] Implanted [id number]     Screw SET SCREW, 5.5, TI NS BRK OFF - SN/A - [id number] Implanted N/A     Spinal Hardware SCREW, MAS 6.5 X 45 CC SOLERA - SN/A - [id number] Implanted N/A     Screw SCREW, MAS 6.5 X 50 CC - SN/A - [id number] Implanted N/A      MEDTRONIC SPACER 24MMX 6MM Implanted N/A     Spinal Hardware MELIDA, 5.5 X 60 CVD TI SOLERA - SN/A - [id number] Implanted N/A              Clinical note: This woman has become disabled with severe lumbar radiculopathy and neurogenic claudication.  She has severe multilevel stenosis.  She has a spondylolisthesis at L4-5.  Conservative measures of failed to relieve her symptoms.  Currently she presents for surgery in the form of a posterior decompression and instrumented fusion.  The rationale for the above-noted procedure has been discussed at length as have the risks benefits expectations limitations alternatives and potential  complications.  She understands and wishes to proceed.    She was brought to the operating room, huddle was held, she was identified, antibiotics administered, sequential compression devices placed.  General anesthetic was secured.  She was carefully placed in the prone position on the Fredi frame with all body prominences well-padded.  Her back was prepped and draped in a sterile fashion.  Midline approach was made and carried down sharply through skin and subcutaneous tissue.  Subperiosteal dissection was carried out to the tips of the transverse processes.  There were markedly hypertrophic changes about the facet joints at each level.  Second radiograph confirmed appropriate level.  A posterior decompression was performed.  The spinous processes of L4 and 3 were trimmed and the lamina thinned with a Leksell rongeur and a high-speed bur.  A plane was created between the underlying dura and the overlying bone and hypertrophic ligamentum flavum.  Exceedingly severe central and lateral recess stenosis was noted at the L4-5.  The inferior facets of L4 were taken down to allow for complete lateral recess and ultimately foraminal decompression.  Partial laminectomy on the superior aspect of L5 was performed so there was no ongoing central stenosis.  The L3 lamina was also decompressed and again the inferior facets of L3 taken down completely to allow for thorough and complete lateral recess and foraminal decompression.  At the completion of the decompression all neural elements were quite free.  The wound was copiously irrigated with antibiotic irrigation.  The local bone was cleaned of all soft tissue and morselized with allograft chips and a bone mill.  The transverse processes L3-5 were decorticated with a high-speed bur and the bone graft mixture along with demineralized bone matrix was packed out liberally on either side.    The O-arm was brought in.  We obtained surgical navigation guidance and employed this to  place pedicle screws bilaterally at L3-5.  The usual sequence of placement was employed with drilling and entry point with a high-speed bur with the use of the pedicle finder and tapping.  6.5 millimeter screws were placed on either side with excellent purchase.  A temporary michele was placed on the right at L4-5 and distraction allowed for opening of the L4-5 disc space.  Carefully the left L5 nerve root was retracted medially to reveal the L4-5 disc space.  A thorough discectomy was performed.  Curettes omar and trials were employed.  A small static cage filled with demineralized bone matrix was impacted into position with solid purchase.  The traction was cut off and the graft was quite stable.  Final x-ray confirmed appropriate position.  Final rods were placed and secured and the locking mechanism tightened.  Meticulous hemostasis was obtained.  Hemovac drain was placed deep to the fascia.  The deep fascia was closed with interrupted #1 Vicryl, the subcutaneous tissue with interrupted 2-0 Vicryl and the skin with a 4-0 Vicryl in a running subcuticular fashion.  Steri-Strips and a dry sterile dressing were applied.  She was carefully turned into the spine position in her hospital bed, awakened and extubated and transferred to the recovery in stable condition.    ** Dictated with voice recognition software and not immediately reviewed for errors in grammar and/or spelling **  Attending Attestation: I was present and scrubbed for the entire procedure.    Kwaku Gallegos  Phone Number: [phone]

## 2025-01-31 NOTE — H&P
[facility] Department of Orthopaedic Surgery   Surgical History & Physical >30 Days    Reason for Surgery: Severe lumbar back pain with bilateral lower extremity radicular pain; lumbar spinal stenosis  Planned Procedure: Posterior decompression and fusion at L3-L5 with a L4-5 TLIF    History & Physical Reviewed:  Alexia Gonzáles is a 64 y.o. female presenting with the above symptoms. This patient was evaluated as an outpatient, and a plan was made for operative management. Risks and benefits were discussed, and the patient and/or caregivers elected to proceed. The patient presents for the above listed procedure today.     Past Medical History:   Diagnosis Date    Arthritis 2020    Back pain     Chronic pain disorder     Depression     Hypertension     Low back pain     Spinal stenosis      Past Surgical History:   Procedure Laterality Date     SECTION, CLASSIC      CHOLECYSTECTOMY       Social History     Tobacco Use    Smoking status: Some Days     Current packs/day: 0.00     Average packs/day: 0.8 packs/day for 48.0 years (36.0 ttl pk-yrs)     Types: Cigarettes     Start date:      Last attempt to quit: 2025     Years since quittin.0     Passive exposure: Current    Smokeless tobacco: Never   Substance Use Topics    Alcohol use: Not Currently     Comment: occassionally     Prior to Admission medications    Medication Sig Start Date End Date Taking? Authorizing Provider   gabapentin (Neurontin) 300 mg capsule Take 1 capsule (300 mg) by mouth 3 times a day. 24 Yes Eugene Coates MD   ibuprofen 800 mg tablet Take 1 tablet (800 mg) by mouth every 8 hours if needed for moderate pain (4 - 6). 1/8/25 3/9/25 Yes Trupti Callahan, APRN-CNP   losartan (Cozaar) 50 mg tablet Take 1 tablet (50 mg) by mouth once daily. 3/7/24 3/7/25 Yes Natasha Sigala MD   multivitamin tablet Take 1 tablet by mouth once daily.   Yes Historical Provider, MD   sertraline (Zoloft) 50 mg tablet Take  1 tablet (50 mg) by mouth once daily. 1/28/25  Yes Sagar Jones MD   chlorhexidine (Peridex) 0.12 % solution Swish for 30 seconds and spit 15mL of solution the night before and morning of surgery  Patient not taking: Reported on 1/31/2025 1/20/25   Trupti Rodriguez PA-C   sertraline (Zoloft) 50 mg tablet TAKE ONE TABLET BY MOUTH EVERY DAY 7/11/24 1/28/25  Natasha Sigala MD     No Known Allergies    Review of Systems:   Gen: Denies recent weight loss  Neuro: Denies recent confusion  Ophtho: Denies changes in vision  ENT: Denies changes in hearing  Endo: Denies weight loss/weight gain  CV: Denies chest pain  Resp: Denies shortness of breath  GI: Denies melena/hematochezia  : Denies painful urination  MSK: Per above HPI  Heme: No abnormal bleeding  Psych: Denies hallucinations    Physical Exam:  - Constitutional: No acute distress, cooperative  - Eyes: EOM grossly intact  - Head/Neck: Trachea midline  - Respiratory/Thorax: Normal work of breathing  - Cardiovascular: RRR on peripheral palpation  - Gastrointestinal: Nondistended  - Psychological: Appropriate mood/behavior  - Skin: Warm and dry. Additional findings in musculoskeletal evaluation  - Musculoskeletal: Moves all extremities    ERAS patient?: No    COVID-19 Risk Consent:   Surgeon has reviewed the key risks related to meme COVID-19 and subsequent sequelae.       Daniel Baldwin MD   Orthopaedic Surgery PGY-2

## 2025-02-01 PROCEDURE — 2500000004 HC RX 250 GENERAL PHARMACY W/ HCPCS (ALT 636 FOR OP/ED)

## 2025-02-01 PROCEDURE — 97535 SELF CARE MNGMENT TRAINING: CPT | Mod: GO

## 2025-02-01 PROCEDURE — 97110 THERAPEUTIC EXERCISES: CPT | Mod: GP

## 2025-02-01 PROCEDURE — 2500000001 HC RX 250 WO HCPCS SELF ADMINISTERED DRUGS (ALT 637 FOR MEDICARE OP): Performed by: ORTHOPAEDIC SURGERY

## 2025-02-01 PROCEDURE — 2500000001 HC RX 250 WO HCPCS SELF ADMINISTERED DRUGS (ALT 637 FOR MEDICARE OP)

## 2025-02-01 PROCEDURE — 2500000002 HC RX 250 W HCPCS SELF ADMINISTERED DRUGS (ALT 637 FOR MEDICARE OP, ALT 636 FOR OP/ED)

## 2025-02-01 PROCEDURE — 97165 OT EVAL LOW COMPLEX 30 MIN: CPT | Mod: GO

## 2025-02-01 PROCEDURE — 97162 PT EVAL MOD COMPLEX 30 MIN: CPT | Mod: GP

## 2025-02-01 PROCEDURE — 97530 THERAPEUTIC ACTIVITIES: CPT | Mod: GO

## 2025-02-01 PROCEDURE — 9420000001 HC RT PATIENT EDUCATION 5 MIN

## 2025-02-01 PROCEDURE — 1100000001 HC PRIVATE ROOM DAILY

## 2025-02-01 RX ORDER — CALCIUM CARBONATE 200(500)MG
500 TABLET,CHEWABLE ORAL DAILY
Status: DISCONTINUED | OUTPATIENT
Start: 2025-02-01 | End: 2025-02-03 | Stop reason: HOSPADM

## 2025-02-01 RX ADMIN — CEFAZOLIN SODIUM 2 G: 2 INJECTION, SOLUTION INTRAVENOUS at 03:00

## 2025-02-01 RX ADMIN — GABAPENTIN 300 MG: 300 CAPSULE ORAL at 09:57

## 2025-02-01 RX ADMIN — SENNOSIDES 17.2 MG: 8.6 TABLET, FILM COATED ORAL at 20:26

## 2025-02-01 RX ADMIN — SERTRALINE 50 MG: 50 TABLET, FILM COATED ORAL at 10:04

## 2025-02-01 RX ADMIN — SENNOSIDES 17.2 MG: 8.6 TABLET, FILM COATED ORAL at 09:58

## 2025-02-01 RX ADMIN — OXYCODONE HYDROCHLORIDE 5 MG: 5 TABLET ORAL at 09:59

## 2025-02-01 RX ADMIN — CEFAZOLIN SODIUM 2 G: 2 INJECTION, SOLUTION INTRAVENOUS at 11:03

## 2025-02-01 RX ADMIN — METHOCARBAMOL 1000 MG: 500 TABLET ORAL at 20:25

## 2025-02-01 RX ADMIN — DEXAMETHASONE SODIUM PHOSPHATE 10 MG: 10 INJECTION INTRAMUSCULAR; INTRAVENOUS at 05:10

## 2025-02-01 RX ADMIN — ACETAMINOPHEN 975 MG: 325 TABLET, FILM COATED ORAL at 09:57

## 2025-02-01 RX ADMIN — OXYCODONE HYDROCHLORIDE 5 MG: 5 TABLET ORAL at 02:56

## 2025-02-01 RX ADMIN — ACETAMINOPHEN 975 MG: 325 TABLET, FILM COATED ORAL at 19:36

## 2025-02-01 RX ADMIN — OXYCODONE HYDROCHLORIDE 5 MG: 5 TABLET ORAL at 19:35

## 2025-02-01 RX ADMIN — GABAPENTIN 300 MG: 300 CAPSULE ORAL at 20:26

## 2025-02-01 RX ADMIN — OXYCODONE HYDROCHLORIDE 5 MG: 5 TABLET ORAL at 14:19

## 2025-02-01 RX ADMIN — CALCIUM CARBONATE 500 MG: 500 TABLET, CHEWABLE ORAL at 11:03

## 2025-02-01 RX ADMIN — DEXAMETHASONE SODIUM PHOSPHATE 10 MG: 10 INJECTION INTRAMUSCULAR; INTRAVENOUS at 14:20

## 2025-02-01 RX ADMIN — POLYETHYLENE GLYCOL 3350 17 G: 17 POWDER, FOR SOLUTION ORAL at 09:58

## 2025-02-01 RX ADMIN — CEFAZOLIN SODIUM 2 G: 2 INJECTION, SOLUTION INTRAVENOUS at 20:26

## 2025-02-01 RX ADMIN — ACETAMINOPHEN 975 MG: 325 TABLET, FILM COATED ORAL at 02:56

## 2025-02-01 RX ADMIN — METHOCARBAMOL 1000 MG: 500 TABLET ORAL at 09:57

## 2025-02-01 RX ADMIN — METHOCARBAMOL 1000 MG: 500 TABLET ORAL at 14:19

## 2025-02-01 ASSESSMENT — COGNITIVE AND FUNCTIONAL STATUS - GENERAL
CLIMB 3 TO 5 STEPS WITH RAILING: A LITTLE
WALKING IN HOSPITAL ROOM: A LITTLE
DRESSING REGULAR LOWER BODY CLOTHING: A LITTLE
CLIMB 3 TO 5 STEPS WITH RAILING: A LITTLE
DRESSING REGULAR LOWER BODY CLOTHING: A LITTLE
DAILY ACTIVITIY SCORE: 21
TOILETING: A LITTLE
HELP NEEDED FOR BATHING: A LITTLE
MOBILITY SCORE: 19
DAILY ACTIVITIY SCORE: 21
STANDING UP FROM CHAIR USING ARMS: A LITTLE
DRESSING REGULAR LOWER BODY CLOTHING: A LITTLE
DRESSING REGULAR UPPER BODY CLOTHING: A LITTLE
MOVING TO AND FROM BED TO CHAIR: A LITTLE
STANDING UP FROM CHAIR USING ARMS: A LITTLE
TOILETING: A LITTLE
HELP NEEDED FOR BATHING: A LITTLE
CLIMB 3 TO 5 STEPS WITH RAILING: A LITTLE
DRESSING REGULAR LOWER BODY CLOTHING: A LITTLE
PERSONAL GROOMING: A LITTLE
MOVING TO AND FROM BED TO CHAIR: A LITTLE
MOBILITY SCORE: 20
DAILY ACTIVITIY SCORE: 21
MOBILITY SCORE: 23
DAILY ACTIVITIY SCORE: 19
WALKING IN HOSPITAL ROOM: A LITTLE
PERSONAL GROOMING: A LITTLE
PERSONAL GROOMING: A LITTLE
MOBILITY SCORE: 24
TURNING FROM BACK TO SIDE WHILE IN FLAT BAD: A LITTLE
TOILETING: A LITTLE
TOILETING: A LITTLE

## 2025-02-01 ASSESSMENT — PAIN SCALES - GENERAL
PAINLEVEL_OUTOF10: 2
PAINLEVEL_OUTOF10: 6
PAINLEVEL_OUTOF10: 2
PAINLEVEL_OUTOF10: 5 - MODERATE PAIN
PAINLEVEL_OUTOF10: 6
PAINLEVEL_OUTOF10: 5 - MODERATE PAIN
PAINLEVEL_OUTOF10: 5 - MODERATE PAIN
PAINLEVEL_OUTOF10: 1
PAINLEVEL_OUTOF10: 5 - MODERATE PAIN

## 2025-02-01 ASSESSMENT — PAIN - FUNCTIONAL ASSESSMENT
PAIN_FUNCTIONAL_ASSESSMENT: 0-10

## 2025-02-01 ASSESSMENT — PAIN DESCRIPTION - LOCATION
LOCATION: BACK

## 2025-02-01 ASSESSMENT — ACTIVITIES OF DAILY LIVING (ADL)
BATHING_ASSISTANCE: STAND BY
ADL_ASSISTANCE: INDEPENDENT
ADL_ASSISTANCE: INDEPENDENT

## 2025-02-01 ASSESSMENT — PAIN DESCRIPTION - ORIENTATION
ORIENTATION: LOWER
ORIENTATION: LOWER
ORIENTATION: MID
ORIENTATION: LOWER

## 2025-02-01 ASSESSMENT — PAIN SCALES - PAIN ASSESSMENT IN ADVANCED DEMENTIA (PAINAD): TOTALSCORE: MEDICATION (SEE MAR)

## 2025-02-01 NOTE — PROGRESS NOTES
Physical Therapy    Physical Therapy Evaluation & Treatment    Patient Name: Alexia Gonzáles  MRN: [id number]  Department: [facility] PACU  Room: [facility]706-A  Today's Date: 2/1/2025   Time Calculation  Start Time: 0908  Stop Time: 0944  Time Calculation (min): 36 min    Assessment/Plan   PT Assessment  PT Assessment Results: Decreased strength, Decreased endurance, Decreased mobility, Pain  Rehab Prognosis: Good  Evaluation/Treatment Tolerance: Patient tolerated treatment well  Medical Staff Made Aware: Yes  End of Session Communication: Bedside nurse  Assessment Comment: Pt presents with decreased strength, endurance, functional mobility. Pt performed dynamic sitting balance exercises 10 reps, able to perform sit to stand with close supervision using RW. Pt will be benefit from on going PT services to return to [facility].  End of Session Patient Position: Bed, 3 rail up, Alarm on   IP OR SWING BED PT PLAN  Inpatient or Swing Bed: Inpatient  PT Plan  Treatment/Interventions: Transfer training, Gait training, Stair training, Strengthening, Therapeutic exercise, Therapeutic activity, Positioning  PT Plan: Ongoing PT  PT Frequency: 4 times per week  PT Discharge Recommendations: Low intensity level of continued care  PT Recommended Transfer Status: Stand by assist  PT - OK to Discharge: Yes      Subjective     General Visit Information:  General  Reason for Referral: post decompression and fusion at L3-L5 with L4-5 TLIF.  Referred By: Daniel Baldwin MD  Past Medical History Relevant to Rehab:   Past Medical History:   Diagnosis Date    Arthritis 2020    Back pain     Chronic pain disorder     Depression     Hypertension     Low back pain     Spinal stenosis       Family/Caregiver Present: No  Prior to Session Communication: Bedside nurse  Patient Position Received: Bed, 3 rail up, Alarm off, not on at start of session  Preferred Learning Style: auditory, kinesthetic, verbal  General Comment: Pt is pleasant and cooperative.  Home  Living:  Home Living  Type of Home: House  Lives With: Alone  Home Adaptive Equipment: None  Home Layout: Two level, 1/2 bath on main level, Bed/bath upstairs (12 stairs to 2nd floor with unilateral rails.)  Home Access: Stairs to enter without rails (1 step to enetr without rail.)  Entrance Stairs-Rails: None  Prior Level of Function:  Prior Function Per Pt/Caregiver Report  Level of [place]: Independent with ADLs and functional transfers, Independent with homemaking with ambulation (I with ambulation without AD.)  ADL Assistance: Independent  Homemaking Assistance: Independent  Ambulatory Assistance: Independent (without AD.)  Precautions:  Precautions  Medical Precautions: Fall precautions  Post-Surgical Precautions: Other (comment) (log rolling)      Objective   Pain:  Pain Assessment  Pain Assessment: 0-10  0-10 (Numeric) Pain Score: 5 - Moderate pain  Pain Type: Acute pain, Surgical pain  Pain Location: Back  Pain Orientation: Lower  Pain Interventions: Repositioned, Relaxation technique  Cognition:  Cognition  Overall Cognitive Status: Within Functional Limits  Orientation Level: Oriented X4    General Assessments:  General Observation  General Observation: supine in bed, hep-lock IV, SCD BL LE, hemovac, surgical dressing at back.     Activity Tolerance  Endurance: Tolerates 30+ min exercise without fatigue    Strength  Strength Comments: WFL grossly in BL LE.  Postural Control  Postural Control: Within Functional Limits    Static Sitting Balance  Static Sitting-Balance Support: Feet supported, Bilateral upper extremity supported  Static Sitting-Level of Assistance: Modified independent  Dynamic Sitting Balance  Dynamic Sitting-Balance Support: Bilateral upper extremity supported, Feet supported  Dynamic Sitting-Level of Assistance: Modified independent    Static Standing Balance  Static Standing-Balance Support: Bilateral upper extremity supported  Static Standing-Level of Assistance: Close  supervision  Dynamic Standing Balance  Dynamic Standing-Balance Support: Bilateral upper extremity supported  Dynamic Standing-Level of Assistance: Close supervision  Functional Assessments:  Bed Mobility  Bed Mobility: Yes  Bed Mobility 1  Bed Mobility 1: Log roll, Sitting to supine, Supine to sitting  Level of Assistance 1: Modified independent    Transfers  Transfer: Yes  Transfer 1  Technique 1: Sit to stand, Stand to sit  Transfer Device 1: Walker, Gait belt  Transfer Level of Assistance 1: Close supervision    Ambulation/Gait Training  Ambulation/Gait Training Performed: Yes  Ambulation/Gait Training 1  Surface 1: Level tile  Device 1: Rolling walker  Gait Support Devices: Gait belt, Wheelchair follow  Assistance 1: Contact guard  Quality of Gait 1: Decreased step length  Comments/Distance (ft) 1: 150 feet    Stairs  Stairs: Yes  Stairs  Rails 1: Right  Assistance 1: Contact guard  Comment/Number of Steps 1: 4  Extremity/Trunk Assessments:  RUE   RUE : Within Functional Limits  LUE   LUE: Within Functional Limits  RLE   RLE : Within Functional Limits  LLE   LLE : Within Functional Limits  Treatments:  Therapeutic Exercise  Therapeutic Exercise Performed: Yes  Therapeutic Exercise Activity 1: LAQ,SREEDHAR, ankle pumps, 10 reps.  Outcome Measures:  [facility] Basic Mobility  Turning from your back to your side while in a flat bed without using bedrails: None  Moving from lying on your back to sitting on the side of a flat bed without using bedrails: None  Moving to and from bed to chair (including a wheelchair): A little  Standing up from a chair using your arms (e.g. wheelchair or bedside chair): A little  To walk in hospital room: A little  Climbing 3-5 steps with railing: A little  Basic Mobility - Total Score: 20    Encounter Problems       Encounter Problems (Active)       Mobility       LTG - Patient will navigate 12 steps with unilateral rails with LRAD with MI.       Start:  02/01/25    Expected End:  02/15/25             STG - Patient will ambulate 250 feet with RW with MI.       Start:  02/01/25    Expected End:  02/15/25               PT Transfers       LTG - Patient will transfer from one surface to another with MI.       Start:  02/01/25    Expected End:  02/15/25            STG - Patient will transfer sit to and from stand with MI with LRAD.       Start:  02/01/25    Expected End:  02/15/25               Pain              Education Documentation  Handouts, taught by Lexi Jain PT at 2/1/2025 10:14 AM.  Learner: Patient  Readiness: Acceptance  Method: Explanation, Demonstration  Response: Verbalizes Understanding    Precautions, taught by Lexi Jain PT at 2/1/2025 10:14 AM.  Learner: Patient  Readiness: Acceptance  Method: Explanation, Demonstration  Response: Verbalizes Understanding    Body Mechanics, taught by Lexi Jain PT at 2/1/2025 10:14 AM.  Learner: Patient  Readiness: Acceptance  Method: Explanation, Demonstration  Response: Verbalizes Understanding    Home Exercise Program, taught by Lexi Jain PT at 2/1/2025 10:14 AM.  Learner: Patient  Readiness: Acceptance  Method: Explanation, Demonstration  Response: Verbalizes Understanding    Mobility Training, taught by Lexi Jain PT at 2/1/2025 10:14 AM.  Learner: Patient  Readiness: Acceptance  Method: Explanation, Demonstration  Response: Verbalizes Understanding    Education Comments  No comments found.

## 2025-02-01 NOTE — PROGRESS NOTES
"Alexia Gonzáles is a 64 y.o. female on day 1 of admission presenting with Spinal stenosis, lumbar region with neurogenic claudication.    Subjective       POD 1    Doing well  Pre-op radicular symptoms resolved  Afeb vss  mild drain o/p  Strength intact    Mobilize and maintain drain    Objective     Physical Exam    Last Recorded Vitals  Blood pressure 119/73, pulse 77, temperature 37.1 °C (98.8 °F), temperature source Temporal, resp. rate 18, height 1.651 m (5' 5\"), weight 77.6 kg (171 lb 1.2 oz), SpO2 94%.  Intake/Output last 3 Shifts:  I/O last 3 completed shifts:  In: 3700 (47.7 mL/kg) [I.V.:2000 (25.8 mL/kg); IV Piggyback:1700]  Out: 595 (7.7 mL/kg) [Urine:400 (0.1 mL/kg/hr); Drains:195]  Weight: 77.6 kg     Relevant Results      Scheduled medications  acetaminophen, 975 mg, oral, q8h  calcium carbonate, 500 mg, oral, Daily  ceFAZolin, 2 g, intravenous, q8h  dexAMETHasone, 10 mg, intravenous, q8h WILDER  gabapentin, 300 mg, oral, TID  methocarbamol, 1,000 mg, oral, TID  polyethylene glycol, 17 g, oral, Daily  sennosides, 2 tablet, oral, BID  sertraline, 50 mg, oral, Daily      Continuous medications  lactated Ringer's, 100 mL/hr, Last Rate: 100 mL/hr (02/01/25 0500)      PRN medications  PRN medications: bisacodyl, dextrose, glucagon, HYDROmorphone, naloxone, ondansetron ODT **OR** ondansetron, oxyCODONE, oxyCODONE, oxygen, prochlorperazine **OR** prochlorperazine **OR** [DISCONTINUED] prochlorperazine  Results for orders placed or performed during the hospital encounter of 01/31/25 (from the past 24 hours)   VERIFY ABO/Rh Group Test   Result Value Ref Range    ABO TYPE B     Rh TYPE NEG                             Assessment/Plan   Assessment & Plan  Spinal stenosis, lumbar region with neurogenic claudication    Chronic low back pain    Degenerative lumbar spinal stenosis            I spent   minutes in the professional and overall care of this patient.      Kwaku Gallegos MD      "

## 2025-02-01 NOTE — PROGRESS NOTES
Occupational Therapy    Evaluation/Treatment    Patient Name: Alexia Gonzáles  MRN: [id number]  Department: [facility] PACU  Room: [facility]70-A  Today's Date: 02/01/25  Time Calculation  Start Time: 1617  Stop Time: 1652  Time Calculation (min): 35 min     Assessment:  OT Assessment: Patient is doing well with ADL and functional mobility and demonstrates good awareness of post-surgical spine precautions.  Patient has no further skilled OT needs at this time.  Prognosis: Excellent  Barriers to Discharge Home: No anticipated barriers  Evaluation/Treatment Tolerance: Patient tolerated treatment well  Medical Staff Made Aware: Yes  End of Session Communication: Bedside nurse  End of Session Patient Position: Bed, 2 rail up, Alarm on (All needs within reach; SCDs on)  Prognosis: Excellent  Evaluation/Treatment Tolerance: Patient tolerated treatment well  Medical Staff Made Aware: Yes  Strengths: Ability to acquire knowledge, Attitude of self, Premorbid level of function, Support of Caregivers  Barriers to Participation:  (none)    Plan:  Treatment Interventions: ADL retraining, Functional transfer training, Patient/family training  OT Frequency: OT eval only  OT Discharge Recommendations: No further acute OT, No OT needed after discharge  Equipment Recommended upon Discharge:  (Reacher; Shower seat)  OT Recommended Transfer Status:  (Supervision)  OT - OK to Discharge: Yes  Treatment Interventions: ADL retraining, Functional transfer training, Patient/family training    Subjective   General:   OT Received On: 02/01/25  General  Reason for Referral: Spinal stenosis, lumbar region with neurogenic claudications/p Posterior decompression L3-5, Posterior lateral fusion L3-5 with pedicle screw instrumentation, Transforaminal lumbar interbody fusion L4-5 1/31  Past Medical History Relevant to Rehab: PMH: spinal stenosis, arthritis, back pain, chronic pain d/o, depression, HTN, former smoker - reports no smoking x1 month  Family/Caregiver  "Present:  (Significant other left at start of session)  Prior to Session Communication: Bedside nurse  Patient Position Received: Bed, 2 rail up, Alarm off, not on at start of session  General Comment: Agreeable to eval; Pleasant and cooperative throughout     Precautions:  Medical Precautions: Fall precautions, Spinal precautions  Post-Surgical Precautions: Spinal precautions  Precautions Comment: IV, surgical drain    Pain:  Pain Assessment  Pain Assessment: 0-10  0-10 (Numeric) Pain Score: 6  Pain Type: Surgical pain  Pain Location: Back  Pain Orientation: Mid, Lower  Pain Interventions: Ambulation/increased activity, Repositioned  Response to Interventions: Decrease in pain, Content/relaxed  Multiple Pain Sites:  (Did not initially c/o headache, but stated, \"My headache is gone\" upon return to bed after ADL and functional mobility.)    Objective   Cognition:  Overall Cognitive Status: Within Functional Limits  Orientation Level: Oriented X4  Safety/Judgement: Within Functional Limits  Insight: Within function limits           Home Living:  Type of Home: House  Lives With: Alone (significant other available to assist)  Home Adaptive Equipment: None  Home Layout: 1/2 bath on main level, Bed/bath upstairs  Home Access: Stairs to enter without rails (1 ASHWIN)  Bathroom Shower/Tub: Tub/shower unit  Bathroom Toilet: Standard  Bathroom Equipment:  (Built-in soap dish with handle that patient sometimes uses for light support)    Prior Function:  Level of [place]: Independent with ADLs and functional transfers, Independent with homemaking with ambulation  ADL Assistance: Independent  Homemaking Assistance: Independent  Ambulatory Assistance: Independent  Hand Dominance: Right       ADL:  Eating Assistance: Independent  Grooming Deficit: Supervision/safety  Bathing Assistance: Stand by  Bathing Deficit: Supervision/safety, Setup (demo'd ability to reach all areas)  LE Dressing Assistance: Stand by  LE Dressing Deficit: " Setup, Supervision/safety  Toileting Deficit: Supervison/safety    Activities of Daily Living:    Grooming  Grooming Level of Assistance: Distant supervision  Grooming Where Assessed: Standing sinkside  Grooming Comments: Patient able to safely mobilize to sink to complete hand hygiene after toileting.    LE Dressing  LE Dressing: Yes  LE Dressing Adaptive Equipment:  (reacher & sock aid available, but not needed)  Sock Level of Assistance: Close supervision, Minimal verbal cues  LE Dressing Where Assessed: Edge of bed  LE Dressing Comments: Educated on AE options and instructed on safe technique to maintain spine precautions.  Patient able to cross each leg to don/doff socks without assistance.  Increased time and effort req'd for RLE.    Toileting  Toileting Level of Assistance: Distant supervision  Where Assessed: Toilet  Toileting Comments: Instructed on safe technique and body positioning to maintain spine precautions.  Answered questions and provided demo of various modifications.  Patient expressed understanding and was able to safely complete toileting in bathroom.    Activity Tolerance:  Endurance: Endurance does not limit participation in activity    Functional Standing Tolerance:  Functional Standing Tolerance Comments: WFL for ADL    Bed Mobility/Transfers: Bed Mobility  Bed Mobility: Yes  Bed Mobility 1  Bed Mobility 1: Supine to sitting, Sitting to supine, Log roll  Level of Assistance 1: Modified independent  Bed Mobility Comments 1: Patient able to talk through steps of logroll and perform without assist; flat bed with bed rail lowered to simulate home setup    Transfers  Transfer: Yes  Transfer 1  Transfer From 1: Bed to  Transfer to 1: Toilet  Technique 1: Sit to stand, Stand to sit (ambulating transfer)  Transfer Device 1: Walker  Transfer Level of Assistance 1: Close supervision, Minimal verbal cues  Transfers 2  Transfer From 2: Toilet to  Transfer to 2: Bed  Technique 2: Stand to sit, Sit to  stand (ambulating transfer)  Transfer Device 2: Walker  Transfer Level of Assistance 2: Close supervision, Minimal verbal cues    Toilet Transfers  Toilet Transfer From: Walker  Toilet Transfer Type: To and from  Toilet Transfer to: Standard toilet  Toilet Transfer Technique: Ambulating  Toilet Transfers: Supervision, Verbal cues    Functional Mobility:  Functional Mobility  Functional Mobility Performed: Yes  Functional Mobility 1  Surface 1: Level tile  Device 1: Rolling walker  Assistance 1: Close supervision  Comments 1: Patient able to safely mobilize to/from bathroom and moderate distance in the galdamez.  No LOB noted.  No c/o increased pain.  Good awareness of spine precautions during functional mobility.    Sitting Balance:  Static Sitting Balance  Static Sitting-Level of Assistance: Independent  Dynamic Sitting Balance  Dynamic Sitting-Level of Assistance: Independent    Standing Balance:  Static Standing Balance  Static Standing-Level of Assistance: Distant supervision  Dynamic Standing Balance  Dynamic Standing-Level of Assistance: Close supervision  Dynamic Standing-Balance: Turning       Therapy/Activity: Therapeutic Activity  Therapeutic Activity Performed: Yes  Therapeutic Activity 1: Instructed on safe technique and body positioning to maintain spine precautions during bed mobility, transfers, ADL, and functional mobility.  Therapeutic Activity 2: Facilitated safe participation in ADL and functional mobility.       Vision:Vision - Basic Assessment  Current Vision: No visual deficits    Sensation:  Sensation Comment: WFL    Strength:  Strength Comments: BUE WFL       Coordination:  Movements are Fluid and Coordinated: Yes     Hand Function:  Hand Function  Gross Grasp: Functional  Coordination: Functional    Extremities: RUE   RUE : Within Functional Limits and LUE   LUE: Within Functional Limits    Outcome Measures: [facility] Daily Activity  Putting on and taking off regular lower body clothing: A  little  Bathing (including washing, rinsing, drying): A little  Putting on and taking off regular upper body clothing: A little  Toileting, which includes using toilet, bedpan or urinal: A little  Taking care of personal grooming such as brushing teeth: A little  Eating Meals: None  Daily Activity - Total Score: 19    Education Documentation  Handouts, taught by Deysi Swift OT at 2/1/2025  5:45 PM.  Learner: Patient  Readiness: Acceptance  Method: Explanation, Demonstration, Handout  Response: Verbalizes Understanding, Demonstrated Understanding    Body Mechanics, taught by Deysi Swift OT at 2/1/2025  5:45 PM.  Learner: Patient  Readiness: Acceptance  Method: Explanation, Demonstration, Handout  Response: Verbalizes Understanding, Demonstrated Understanding    Precautions, taught by Deysi Swift OT at 2/1/2025  5:45 PM.  Learner: Patient  Readiness: Acceptance  Method: Explanation, Demonstration, Handout  Response: Verbalizes Understanding, Demonstrated Understanding    ADL Training, taught by Deysi Swift OT at 2/1/2025  5:45 PM.  Learner: Patient  Readiness: Acceptance  Method: Explanation, Demonstration, Handout  Response: Verbalizes Understanding, Demonstrated Understanding    Education Comments  No comments found.

## 2025-02-01 NOTE — CARE PLAN
The patient's goals for the shift include      The clinical goals for the shift include patient to ambulate for shift/ remain HDS for shift      Problem: Pain - Adult  Goal: Verbalizes/displays adequate comfort level or baseline comfort level  Outcome: Progressing     Problem: Safety - Adult  Goal: Free from fall injury  Outcome: Progressing     Problem: Discharge Planning  Goal: Discharge to home or other facility with appropriate resources  Outcome: Progressing     Problem: Chronic Conditions and Co-morbidities  Goal: Patient's chronic conditions and co-morbidity symptoms are monitored and maintained or improved  Outcome: Progressing     Problem: Nutrition  Goal: Nutrient intake appropriate for maintaining nutritional needs  Outcome: Progressing     Problem: Pain  Goal: Takes deep breaths with improved pain control throughout the shift  Outcome: Progressing  Goal: Turns in bed with improved pain control throughout the shift  Outcome: Progressing  Goal: Walks with improved pain control throughout the shift  Outcome: Progressing  Goal: Performs ADL's with improved pain control throughout shift  Outcome: Progressing  Goal: Participates in PT with improved pain control throughout the shift  Outcome: Progressing  Goal: Free from opioid side effects throughout the shift  Outcome: Progressing  Goal: Free from acute confusion related to pain meds throughout the shift  Outcome: Progressing

## 2025-02-02 VITALS
SYSTOLIC BLOOD PRESSURE: 132 MMHG | HEART RATE: 57 BPM | DIASTOLIC BLOOD PRESSURE: 70 MMHG | RESPIRATION RATE: 16 BRPM | TEMPERATURE: 97.2 F | HEIGHT: 65 IN | BODY MASS INDEX: 28.5 KG/M2 | OXYGEN SATURATION: 93 % | WEIGHT: 171.08 LBS

## 2025-02-02 PROCEDURE — 97116 GAIT TRAINING THERAPY: CPT | Mod: GP,CQ

## 2025-02-02 PROCEDURE — 2500000004 HC RX 250 GENERAL PHARMACY W/ HCPCS (ALT 636 FOR OP/ED)

## 2025-02-02 PROCEDURE — 9420000001 HC RT PATIENT EDUCATION 5 MIN

## 2025-02-02 PROCEDURE — 97530 THERAPEUTIC ACTIVITIES: CPT | Mod: GP,CQ

## 2025-02-02 PROCEDURE — 2500000001 HC RX 250 WO HCPCS SELF ADMINISTERED DRUGS (ALT 637 FOR MEDICARE OP): Performed by: ORTHOPAEDIC SURGERY

## 2025-02-02 PROCEDURE — 2500000001 HC RX 250 WO HCPCS SELF ADMINISTERED DRUGS (ALT 637 FOR MEDICARE OP)

## 2025-02-02 PROCEDURE — 1100000001 HC PRIVATE ROOM DAILY

## 2025-02-02 PROCEDURE — 2500000002 HC RX 250 W HCPCS SELF ADMINISTERED DRUGS (ALT 637 FOR MEDICARE OP, ALT 636 FOR OP/ED)

## 2025-02-02 RX ADMIN — OXYCODONE HYDROCHLORIDE 5 MG: 5 TABLET ORAL at 13:20

## 2025-02-02 RX ADMIN — SENNOSIDES 17.2 MG: 8.6 TABLET, FILM COATED ORAL at 09:07

## 2025-02-02 RX ADMIN — OXYCODONE HYDROCHLORIDE 5 MG: 5 TABLET ORAL at 18:04

## 2025-02-02 RX ADMIN — BISACODYL 10 MG: 5 TABLET, COATED ORAL at 20:10

## 2025-02-02 RX ADMIN — CEFAZOLIN SODIUM 2 G: 2 INJECTION, SOLUTION INTRAVENOUS at 11:50

## 2025-02-02 RX ADMIN — CALCIUM CARBONATE 500 MG: 500 TABLET, CHEWABLE ORAL at 09:07

## 2025-02-02 RX ADMIN — OXYCODONE HYDROCHLORIDE 5 MG: 5 TABLET ORAL at 04:19

## 2025-02-02 RX ADMIN — CEFAZOLIN SODIUM 2 G: 2 INJECTION, SOLUTION INTRAVENOUS at 04:08

## 2025-02-02 RX ADMIN — GABAPENTIN 300 MG: 300 CAPSULE ORAL at 09:07

## 2025-02-02 RX ADMIN — ACETAMINOPHEN 975 MG: 325 TABLET, FILM COATED ORAL at 04:08

## 2025-02-02 RX ADMIN — GABAPENTIN 300 MG: 300 CAPSULE ORAL at 20:10

## 2025-02-02 RX ADMIN — OXYCODONE HYDROCHLORIDE 5 MG: 5 TABLET ORAL at 09:07

## 2025-02-02 RX ADMIN — METHOCARBAMOL 1000 MG: 500 TABLET ORAL at 09:07

## 2025-02-02 RX ADMIN — ACETAMINOPHEN 975 MG: 325 TABLET, FILM COATED ORAL at 20:10

## 2025-02-02 RX ADMIN — METHOCARBAMOL 1000 MG: 500 TABLET ORAL at 16:37

## 2025-02-02 RX ADMIN — METHOCARBAMOL 1000 MG: 500 TABLET ORAL at 21:53

## 2025-02-02 RX ADMIN — POLYETHYLENE GLYCOL 3350 17 G: 17 POWDER, FOR SOLUTION ORAL at 09:08

## 2025-02-02 RX ADMIN — SERTRALINE 50 MG: 50 TABLET, FILM COATED ORAL at 09:08

## 2025-02-02 RX ADMIN — OXYCODONE HYDROCHLORIDE 5 MG: 5 TABLET ORAL at 22:15

## 2025-02-02 RX ADMIN — ACETAMINOPHEN 975 MG: 325 TABLET, FILM COATED ORAL at 11:50

## 2025-02-02 RX ADMIN — GABAPENTIN 300 MG: 300 CAPSULE ORAL at 16:37

## 2025-02-02 ASSESSMENT — COGNITIVE AND FUNCTIONAL STATUS - GENERAL
MOBILITY SCORE: 24
DRESSING REGULAR LOWER BODY CLOTHING: A LITTLE
DAILY ACTIVITIY SCORE: 23
DRESSING REGULAR LOWER BODY CLOTHING: A LITTLE
MOBILITY SCORE: 22
WALKING IN HOSPITAL ROOM: A LITTLE
CLIMB 3 TO 5 STEPS WITH RAILING: A LITTLE
MOBILITY SCORE: 24
DAILY ACTIVITIY SCORE: 23

## 2025-02-02 ASSESSMENT — PAIN SCALES - GENERAL
PAINLEVEL_OUTOF10: 6
PAINLEVEL_OUTOF10: 3
PAINLEVEL_OUTOF10: 0 - NO PAIN
PAINLEVEL_OUTOF10: 1
PAINLEVEL_OUTOF10: 4
PAINLEVEL_OUTOF10: 5 - MODERATE PAIN
PAINLEVEL_OUTOF10: 0 - NO PAIN
PAINLEVEL_OUTOF10: 5 - MODERATE PAIN
PAINLEVEL_OUTOF10: 0 - NO PAIN
PAINLEVEL_OUTOF10: 4

## 2025-02-02 ASSESSMENT — PAIN DESCRIPTION - LOCATION
LOCATION: BACK

## 2025-02-02 ASSESSMENT — PAIN DESCRIPTION - ORIENTATION
ORIENTATION: LOWER

## 2025-02-02 ASSESSMENT — PAIN - FUNCTIONAL ASSESSMENT: PAIN_FUNCTIONAL_ASSESSMENT: 0-10

## 2025-02-02 NOTE — CARE PLAN
The patient's goals for the shift include      The clinical goals for the shift include pt remain safe, pain control, hds

## 2025-02-02 NOTE — PROGRESS NOTES
Physical Therapy    Physical Therapy Treatment    Patient Name: Alexia Gonzáles  MRN: [id number]  Department: [place] [address]  Room: [address] [place] [address]  Today's Date: 2/2/2025  Time Calculation  Start Time: 1104  Stop Time: 1130  Time Calculation (min): 26 min      Assessment/Plan   PT Assessment  PT Assessment Results: Decreased strength, Decreased endurance, Decreased mobility, Pain  Rehab Prognosis: Good  Barriers to Discharge Home: No anticipated barriers  Evaluation/Treatment Tolerance: Patient tolerated treatment well  Medical Staff Made Aware: Yes  End of Session Communication: Bedside nurse  Assessment Comment: Pt completed gait training without AD, steady throughout and continued with stair negotiation  End of Session Patient Position: Bed, 2 rail up, Alarm on (B SCDs in place, call light and needs in reach)  PT Plan  Inpatient/Swing Bed or Outpatient: Inpatient  PT Plan  Treatment/Interventions: Bed mobility, Transfer training, Gait training, Stair training  PT Plan: Ongoing PT  PT Frequency: Daily (Evaluating PT requesting frequency be changed to daily PT.)  PT Discharge Recommendations: Low intensity level of continued care  PT Recommended Transfer Status: Independent  PT - OK to Discharge: Yes (per POC)      General Visit Information:   PT  Visit  PT Received On: 02/02/25  General  Reason for Referral: Spinal stenosis, lumbar region with neurogenic claudications/p Posterior decompression L3-5, Posterior lateral fusion L3-5 with pedicle screw instrumentation, Transforaminal lumbar interbody fusion L4-5 1/31  Referred By: Daniel Baldwin MD  Past Medical History Relevant to Rehab: PMH: spinal stenosis, arthritis, back pain, chronic pain d/o, depression, HTN, former smoker - reports no smoking x1 month  Family/Caregiver Present: No  Prior to Session Communication: Bedside nurse  Patient Position Received: Bed, 2 rail up, Alarm off, not on at start of session  Preferred Learning Style: auditory, kinesthetic, verbal  General  Comment: Pt received supine in bed at start of session    Subjective   Precautions:  Precautions  Medical Precautions: Fall precautions, Spinal precautions  Post-Surgical Precautions: Spinal precautions  Precautions Comment: Pt able to state spinal precautions and demo'd adherence throughout session (hemovac)      Objective   Pain:  Pain Assessment  Pain Assessment: 0-10  0-10 (Numeric) Pain Score: 3  Pain Type: Surgical pain  Pain Location: Back  Pain Orientation: Lower  Pain Interventions: Repositioned  Response to Interventions: No change in pain  Cognition:  Cognition  Overall Cognitive Status: Within Functional Limits  Coordination:  Movements are Fluid and Coordinated: Yes  Postural Control:  Postural Control  Postural Control: Within Functional Limits  Static Sitting Balance  Static Sitting-Balance Support: Feet supported, No upper extremity supported  Static Sitting-Level of Assistance: Independent  Static Sitting-Comment/Number of Minutes: sitting at EOB    Activity Tolerance:  Activity Tolerance  Endurance: Endurance does not limit participation in activity  Treatments:  Bed Mobility  Bed Mobility: Yes  Bed Mobility 1  Bed Mobility 1: Supine to sitting, Sitting to supine, Log roll  Level of Assistance 1: Independent  Bed Mobility Comments 1: bed flat, no use of bed accessories. (Pt maintains logroll throughout)    Ambulation/Gait Training  Ambulation/Gait Training Performed: Yes  Ambulation/Gait Training 1  Surface 1: Level tile  Device 1: Rolling walker  Gait Support Devices: Gait belt  Assistance 1: Distant supervision  Quality of Gait 1: Decreased step length  Comments/Distance (ft) 1: 250' (after ~100', pt ambulates with no AD and SBA x1. Pt is steady throughout with adequate pacing)  Transfers  Transfer: Yes  Transfer 1  Transfer From 1:  (sit<>stand to/from EOB)  Transfer Device 1: Gait belt  Transfer Level of Assistance 1: Distant supervision    Stairs  Stairs: Yes  Stairs  Rails 1: Right  Curb Step  1: No  Device 1: Railing  Support Devices 1: Gait belt  Assistance 1: Close supervision  Comment/Number of Steps 1: 4 steps up/down x1 trial (Step-over-step sequencing with ascent and descent, steady throughout)    Outcome Measures:  [facility] Basic Mobility  Turning from your back to your side while in a flat bed without using bedrails: None  Moving from lying on your back to sitting on the side of a flat bed without using bedrails: None  Moving to and from bed to chair (including a wheelchair): None  Standing up from a chair using your arms (e.g. wheelchair or bedside chair): None  To walk in hospital room: A little  Climbing 3-5 steps with railing: A little  Basic Mobility - Total Score: 22      Encounter Problems       Encounter Problems (Active)       Mobility       LTG - Patient will navigate 12 steps with unilateral rails with LRAD with MI. (Progressing)       Start:  02/01/25    Expected End:  02/15/25            STG - Patient will ambulate 250 feet with RW with MI. (Progressing)       Start:  02/01/25    Expected End:  02/15/25               PT Transfers       LTG - Patient will transfer from one surface to another with MI. (Progressing)       Start:  02/01/25    Expected End:  02/15/25            STG - Patient will transfer sit to and from stand with MI with LRAD. (Progressing)       Start:  02/01/25    Expected End:  02/15/25               Pain

## 2025-02-03 VITALS
HEART RATE: 67 BPM | BODY MASS INDEX: 28.5 KG/M2 | RESPIRATION RATE: 18 BRPM | DIASTOLIC BLOOD PRESSURE: 77 MMHG | TEMPERATURE: 99.9 F | SYSTOLIC BLOOD PRESSURE: 137 MMHG | OXYGEN SATURATION: 96 % | WEIGHT: 171.08 LBS | HEIGHT: 65 IN

## 2025-02-03 PROCEDURE — 2500000004 HC RX 250 GENERAL PHARMACY W/ HCPCS (ALT 636 FOR OP/ED)

## 2025-02-03 PROCEDURE — 2500000001 HC RX 250 WO HCPCS SELF ADMINISTERED DRUGS (ALT 637 FOR MEDICARE OP)

## 2025-02-03 PROCEDURE — 2500000002 HC RX 250 W HCPCS SELF ADMINISTERED DRUGS (ALT 637 FOR MEDICARE OP, ALT 636 FOR OP/ED)

## 2025-02-03 PROCEDURE — 2500000001 HC RX 250 WO HCPCS SELF ADMINISTERED DRUGS (ALT 637 FOR MEDICARE OP): Performed by: ORTHOPAEDIC SURGERY

## 2025-02-03 PROCEDURE — 97116 GAIT TRAINING THERAPY: CPT | Mod: GP,CQ

## 2025-02-03 RX ORDER — BISACODYL 10 MG/1
10 SUPPOSITORY RECTAL ONCE
Status: DISCONTINUED | OUTPATIENT
Start: 2025-02-03 | End: 2025-02-03 | Stop reason: HOSPADM

## 2025-02-03 RX ORDER — METHOCARBAMOL 1000 MG/1
500 TABLET, FILM COATED ORAL 3 TIMES DAILY PRN
Qty: 20 TABLET | Refills: 0 | Status: SHIPPED | OUTPATIENT
Start: 2025-02-03 | End: 2025-02-05 | Stop reason: DRUGHIGH

## 2025-02-03 RX ORDER — OXYCODONE HYDROCHLORIDE 5 MG/1
5 TABLET ORAL EVERY 6 HOURS PRN
Qty: 20 TABLET | Refills: 0 | Status: SHIPPED | OUTPATIENT
Start: 2025-02-03 | End: 2025-02-10

## 2025-02-03 RX ADMIN — CALCIUM CARBONATE 500 MG: 500 TABLET, CHEWABLE ORAL at 09:10

## 2025-02-03 RX ADMIN — OXYCODONE HYDROCHLORIDE 5 MG: 5 TABLET ORAL at 13:52

## 2025-02-03 RX ADMIN — GABAPENTIN 300 MG: 300 CAPSULE ORAL at 09:10

## 2025-02-03 RX ADMIN — HYDROMORPHONE HYDROCHLORIDE 0.4 MG: 1 INJECTION, SOLUTION INTRAMUSCULAR; INTRAVENOUS; SUBCUTANEOUS at 00:12

## 2025-02-03 RX ADMIN — SERTRALINE 50 MG: 50 TABLET, FILM COATED ORAL at 09:10

## 2025-02-03 RX ADMIN — OXYCODONE HYDROCHLORIDE 10 MG: 5 TABLET ORAL at 09:14

## 2025-02-03 RX ADMIN — OXYCODONE HYDROCHLORIDE 5 MG: 5 TABLET ORAL at 06:16

## 2025-02-03 RX ADMIN — ACETAMINOPHEN 975 MG: 325 TABLET, FILM COATED ORAL at 11:47

## 2025-02-03 RX ADMIN — METHOCARBAMOL 1000 MG: 500 TABLET ORAL at 09:10

## 2025-02-03 RX ADMIN — POLYETHYLENE GLYCOL 3350 17 G: 17 POWDER, FOR SOLUTION ORAL at 09:10

## 2025-02-03 RX ADMIN — GABAPENTIN 300 MG: 300 CAPSULE ORAL at 15:25

## 2025-02-03 RX ADMIN — SENNOSIDES 17.2 MG: 8.6 TABLET, FILM COATED ORAL at 09:10

## 2025-02-03 RX ADMIN — ACETAMINOPHEN 975 MG: 325 TABLET, FILM COATED ORAL at 04:38

## 2025-02-03 RX ADMIN — METHOCARBAMOL 1000 MG: 500 TABLET ORAL at 15:25

## 2025-02-03 SDOH — ECONOMIC STABILITY: HOUSING INSECURITY: IN THE LAST 12 MONTHS, WAS THERE A TIME WHEN YOU WERE NOT ABLE TO PAY THE MORTGAGE OR RENT ON TIME?: NO

## 2025-02-03 SDOH — HEALTH STABILITY: MENTAL HEALTH: HOW OFTEN DO YOU HAVE A DRINK CONTAINING ALCOHOL?: NEVER

## 2025-02-03 SDOH — ECONOMIC STABILITY: FOOD INSECURITY: WITHIN THE PAST 12 MONTHS, YOU WORRIED THAT YOUR FOOD WOULD RUN OUT BEFORE YOU GOT THE MONEY TO BUY MORE.: NEVER TRUE

## 2025-02-03 SDOH — ECONOMIC STABILITY: FOOD INSECURITY: HOW HARD IS IT FOR YOU TO PAY FOR THE VERY BASICS LIKE FOOD, HOUSING, MEDICAL CARE, AND HEATING?: NOT HARD AT ALL

## 2025-02-03 SDOH — HEALTH STABILITY: MENTAL HEALTH: HOW MANY DRINKS CONTAINING ALCOHOL DO YOU HAVE ON A TYPICAL DAY WHEN YOU ARE DRINKING?: PATIENT DOES NOT DRINK

## 2025-02-03 SDOH — SOCIAL STABILITY: SOCIAL INSECURITY: ARE YOU MARRIED, WIDOWED, DIVORCED, SEPARATED, NEVER MARRIED, OR LIVING WITH A PARTNER?: DIVORCED

## 2025-02-03 SDOH — ECONOMIC STABILITY: INCOME INSECURITY: IN THE PAST 12 MONTHS HAS THE ELECTRIC, GAS, OIL, OR WATER COMPANY THREATENED TO SHUT OFF SERVICES IN YOUR HOME?: NO

## 2025-02-03 SDOH — ECONOMIC STABILITY: HOUSING INSECURITY: AT ANY TIME IN THE PAST 12 MONTHS, WERE YOU HOMELESS OR LIVING IN A SHELTER (INCLUDING NOW)?: NO

## 2025-02-03 SDOH — HEALTH STABILITY: MENTAL HEALTH: HOW OFTEN DO YOU HAVE SIX OR MORE DRINKS ON ONE OCCASION?: NEVER

## 2025-02-03 SDOH — ECONOMIC STABILITY: TRANSPORTATION INSECURITY: IN THE PAST 12 MONTHS, HAS LACK OF TRANSPORTATION KEPT YOU FROM MEDICAL APPOINTMENTS OR FROM GETTING MEDICATIONS?: NO

## 2025-02-03 SDOH — ECONOMIC STABILITY: FOOD INSECURITY: WITHIN THE PAST 12 MONTHS, THE FOOD YOU BOUGHT JUST DIDN'T LAST AND YOU DIDN'T HAVE MONEY TO GET MORE.: NEVER TRUE

## 2025-02-03 SDOH — ECONOMIC STABILITY: HOUSING INSECURITY: IN THE PAST 12 MONTHS, HOW MANY TIMES HAVE YOU MOVED WHERE YOU WERE LIVING?: 0

## 2025-02-03 ASSESSMENT — COGNITIVE AND FUNCTIONAL STATUS - GENERAL
CLIMB 3 TO 5 STEPS WITH RAILING: A LITTLE
CLIMB 3 TO 5 STEPS WITH RAILING: A LITTLE
DRESSING REGULAR LOWER BODY CLOTHING: A LITTLE
DAILY ACTIVITIY SCORE: 22
HELP NEEDED FOR BATHING: A LITTLE
WALKING IN HOSPITAL ROOM: A LITTLE
MOBILITY SCORE: 22
MOBILITY SCORE: 23

## 2025-02-03 ASSESSMENT — PAIN SCALES - GENERAL
PAINLEVEL_OUTOF10: 4
PAINLEVEL_OUTOF10: 5 - MODERATE PAIN
PAINLEVEL_OUTOF10: 5 - MODERATE PAIN
PAINLEVEL_OUTOF10: 7
PAINLEVEL_OUTOF10: 4
PAINLEVEL_OUTOF10: 0 - NO PAIN
PAINLEVEL_OUTOF10: 7
PAINLEVEL_OUTOF10: 4
PAINLEVEL_OUTOF10: 2

## 2025-02-03 ASSESSMENT — ACTIVITIES OF DAILY LIVING (ADL)
LACK_OF_TRANSPORTATION: NO
LACK_OF_TRANSPORTATION: NO

## 2025-02-03 ASSESSMENT — PAIN DESCRIPTION - ORIENTATION
ORIENTATION: LOWER;POSTERIOR
ORIENTATION: LOWER;POSTERIOR
ORIENTATION: POSTERIOR
ORIENTATION: POSTERIOR

## 2025-02-03 ASSESSMENT — PAIN - FUNCTIONAL ASSESSMENT
PAIN_FUNCTIONAL_ASSESSMENT: 0-10

## 2025-02-03 ASSESSMENT — PAIN DESCRIPTION - LOCATION
LOCATION: BACK
LOCATION: NECK
LOCATION: NECK
LOCATION: BACK

## 2025-02-03 ASSESSMENT — LIFESTYLE VARIABLES
AUDIT-C TOTAL SCORE: 0
SKIP TO QUESTIONS 9-10: 1

## 2025-02-03 NOTE — PROGRESS NOTES
"Alexia Gonzáles is a 64 y.o. female on day 3 of admission presenting with Spinal stenosis, lumbar region with neurogenic claudication.    Subjective   Postoperative day 2    She continues to do well.  Excellent relief of her severe preoperative radicular symptoms.    Ambulating short distances.    Afebrile and vital signs stable.    Strength intact.    Mild drain output.    Continue to mobilize.  Anticipate discharge tomorrow after dressing change and drain removal.       Objective     Physical Exam    Last Recorded Vitals  Blood pressure 132/70, pulse 57, temperature 36.2 °C (97.2 °F), temperature source Temporal, resp. rate 16, height 1.651 m (5' 5\"), weight 77.6 kg (171 lb 1.2 oz), SpO2 93%.  Intake/Output last 3 Shifts:  I/O last 3 completed shifts:  In: 1260 (16.2 mL/kg) [P.O.:960; IV Piggyback:300]  Out: 2141 (27.6 mL/kg) [Urine:1901 (0.7 mL/kg/hr); Drains:240]  Weight: 77.6 kg     Relevant Results      Scheduled medications  acetaminophen, 975 mg, oral, q8h  calcium carbonate, 500 mg, oral, Daily  gabapentin, 300 mg, oral, TID  methocarbamol, 1,000 mg, oral, TID  polyethylene glycol, 17 g, oral, Daily  sennosides, 2 tablet, oral, BID  sertraline, 50 mg, oral, Daily      Continuous medications     PRN medications  PRN medications: bisacodyl, dextrose, glucagon, HYDROmorphone, naloxone, ondansetron ODT **OR** ondansetron, oxyCODONE, oxyCODONE, oxygen, prochlorperazine **OR** prochlorperazine **OR** [DISCONTINUED] prochlorperazine  No results found for this or any previous visit (from the past 24 hours).                         Assessment/Plan   Assessment & Plan  Spinal stenosis, lumbar region with neurogenic claudication    Chronic low back pain    Degenerative lumbar spinal stenosis            I spent   minutes in the professional and overall care of this patient.      Kwaku Gallegos MD      "

## 2025-02-03 NOTE — CARE PLAN
The patient's goals for the shift include      The clinical goals for the shift include hds, safety, pain control,

## 2025-02-03 NOTE — PROGRESS NOTES
Physical Therapy    Physical Therapy Treatment    Patient Name: Alexia Gonzáles  MRN: [id number]  Department: [place] [address]  Room: [address] [place] [address]  Today's Date: 2/3/2025  Time Calculation  Start Time: 1123  Stop Time: 1140  Time Calculation (min): 17 min      Assessment/Plan   PT Assessment  PT Assessment Results: Decreased strength, Decreased endurance, Decreased mobility, Pain  Rehab Prognosis: Good  Barriers to Discharge Home: No anticipated barriers  Evaluation/Treatment Tolerance: Patient tolerated treatment well  Medical Staff Made Aware: Yes  End of Session Communication: Bedside nurse  Assessment Comment: Pt continued to make good progress towards goals this date, including increased stair navigation. Pt will continue to benefit from skilled PT for improved B LE strength for return to PLOF.  End of Session Patient Position:  (seated on toilet, pt instructed to use pull cord when finished. RN notified)  PT Plan  Inpatient/Swing Bed or Outpatient: Inpatient  PT Plan  Treatment/Interventions: Transfer training, Gait training, Stair training  PT Plan: Ongoing PT  PT Frequency: Daily  PT Discharge Recommendations: Low intensity level of continued care  Equipment Recommended upon Discharge: Wheeled walker  PT Recommended Transfer Status: Assist x1  PT - OK to Discharge: Yes (per POC)      General Visit Information:   PT  Visit  PT Received On: 02/03/25  General  Reason for Referral: Spinal stenosis, lumbar region with neurogenic claudications/p Posterior decompression L3-5, Posterior lateral fusion L3-5 with pedicle screw instrumentation, Transforaminal lumbar interbody fusion L4-5 1/31  Referred By: Daniel Baldwin MD  Past Medical History Relevant to Rehab: PMH: spinal stenosis, arthritis, back pain, chronic pain d/o, depression, HTN, former smoker - reports no smoking x1 month  Family/Caregiver Present: No  Prior to Session Communication: Bedside nurse  Patient Position Received:  (up in bathroom)  Preferred Learning  Style: auditory, kinesthetic, verbal  General Comment: Pt received seated on toilet at start of session    Subjective   Precautions:  Precautions  Medical Precautions: Fall precautions  Post-Surgical Precautions: Spinal precautions  Precautions Comment: Pt able to state spinal precautions and demo'd adherence throughout session      Objective   Pain:  Pain Assessment  Pain Assessment: 0-10  0-10 (Numeric) Pain Score: 5 - Moderate pain  Pain Type: Acute pain, Surgical pain  Pain Location: Back  Pain Orientation: Lower, Posterior  Pain Interventions: Repositioned  Response to Interventions: No change in pain  Cognition:  Cognition  Overall Cognitive Status: Within Functional Limits  Coordination:  Movements are Fluid and Coordinated: Yes  Postural Control:  Postural Control  Postural Control: Within Functional Limits  Static Sitting Balance  Static Sitting-Balance Support: Feet supported, No upper extremity supported  Static Sitting-Level of Assistance: Independent  Static Sitting-Comment/Number of Minutes: seated on toilet    Activity Tolerance:  Activity Tolerance  Endurance: Endurance does not limit participation in activity  Treatments:  Ambulation/Gait Training  Ambulation/Gait Training Performed: Yes  Ambulation/Gait Training 1  Surface 1: Level tile  Device 1: Rolling walker  Gait Support Devices: Gait belt  Assistance 1: Distant supervision  Quality of Gait 1: Decreased step length  Comments/Distance (ft) 1: 300' (Steady throughout)  Transfers  Transfer: Yes  Transfer 1  Transfer From 1:  (sit<>stand to/from toilet and chair)  Transfer Device 1: Walker  Transfer Level of Assistance 1: Modified independent    Stairs  Stairs: Yes  Stairs  Rails 1: Right  Curb Step 1: No  Device 1: Railing  Support Devices 1: Gait belt  Assistance 1: Distant supervision  Comment/Number of Steps 1: 4 steps up/down x2 consecutive trials (Pt utilized R HR ascending with B hands on HR (done to mimic home setup). Reciprocal stair  negotiation with modified side-step)    Outcome Measures:  [facility] Basic Mobility  Turning from your back to your side while in a flat bed without using bedrails: None  Moving from lying on your back to sitting on the side of a flat bed without using bedrails: None  Moving to and from bed to chair (including a wheelchair): None  Standing up from a chair using your arms (e.g. wheelchair or bedside chair): None  To walk in hospital room: A little  Climbing 3-5 steps with railing: A little  Basic Mobility - Total Score: 22      Encounter Problems       Encounter Problems (Active)       Mobility       LTG - Patient will navigate 12 steps with unilateral rails with LRAD with MI. (Progressing)       Start:  02/01/25    Expected End:  02/15/25            STG - Patient will ambulate 250 feet with RW with MI. (Progressing)       Start:  02/01/25    Expected End:  02/15/25               PT Transfers       LTG - Patient will transfer from one surface to another with MI. (Met)       Start:  02/01/25    Expected End:  02/15/25    Resolved:  02/03/25         STG - Patient will transfer sit to and from stand with MI with LRAD. (Progressing)       Start:  02/01/25    Expected End:  02/15/25               Pain

## 2025-02-03 NOTE — PROGRESS NOTES
02/03/25 1351   Discharge Planning   Living Arrangements Alone   Support Systems Family members   Assistance Needed none   Type of Residence Private residence   Number of Stairs to Enter Residence 1   Number of Stairs Within Residence 8   Do you have animals or pets at home? Yes   Type of Animals or Pets dog Tio   Who is requesting discharge planning? Provider   Home or Post Acute Services None   Expected Discharge Disposition Home   Does the patient need discharge transport arranged? No   Financial Resource Strain   How hard is it for you to pay for the very basics like food, housing, medical care, and heating? Not hard   Housing Stability   In the last 12 months, was there a time when you were not able to pay the mortgage or rent on time? N   In the past 12 months, how many times have you moved where you were living? 0   At any time in the past 12 months, were you homeless or living in a shelter (including now)? N   Transportation Needs   In the past 12 months, has lack of transportation kept you from medical appointments or from getting medications? no   In the past 12 months, has lack of transportation kept you from meetings, work, or from getting things needed for daily living? No   Stroke Family Assessment   Stroke Family Assessment Needed No   Intensity of Service   Intensity of Service 0-30 min     2/3/25 1352  Met with patient at bedside to discuss discharge planning.  Patient lives at home alone in a duplex.  He son lives next door.  She is independent with ADLs and drives at baseline.  She does not use any assistive devices for ambulation.  She plans on returning home at discharge.  She would like a walker for home.  Order placed and DME notified to dispense.  Patient denies any additional HHC, DME, or discharge needs.  She will notify the [facility] should any needs arise.  ADOD today.  Yolie Reynoso RN [facility]

## 2025-02-03 NOTE — DISCHARGE SUMMARY
Discharge Diagnosis  Spinal stenosis, lumbar region with neurogenic claudication    Issues Requiring Follow-Up       Test Results Pending At Discharge  Pending Labs       No current pending labs.            Hospital Course   This 64-year-old woman has developed disabling lumbar radiculopathy.  She has severe multilevel stenosis.  On the day of admission she underwent a posterior decompression and instrumented fusion.  Postoperatively she did well with excellent relief of her severe preoperative radicular symptoms.  She was neurologically intact.  She voided.  She moved her bowels.  Her drain was removed on postoperative day 3.  She was cleared by physical therapy for discharge home.    Pertinent Physical Exam At Time of Discharge  Physical Exam    Home Medications     Medication List      START taking these medications     methocarbamoL 1,000 mg tablet; Take 500 mg by mouth 3 times a day as   needed for muscle spasms.   oxyCODONE 5 mg immediate release tablet; Commonly known as: Roxicodone;   Take 1 tablet (5 mg) by mouth every 6 hours if needed for severe pain (7 -   10) for up to 7 days.     CONTINUE taking these medications     gabapentin 300 mg capsule; Commonly known as: Neurontin; Take 1 capsule   (300 mg) by mouth 3 times a day.   losartan 50 mg tablet; Commonly known as: Cozaar; Take 1 tablet (50 mg)   by mouth once daily.   sertraline 50 mg tablet; Commonly known as: Zoloft; Take 1 tablet (50   mg) by mouth once daily.     STOP taking these medications     chlorhexidine 0.12 % solution; Commonly known as: Peridex   ibuprofen 800 mg tablet   multivitamin tablet       Outpatient Follow-Up  Future Appointments   Date Time Provider Department [place]   2/12/2025 11:20 AM MD ÁLVARO [id number] [facility]   3/19/2025 11:20 AM MD COLIN [id number] [facility]       Kwaku Gallegos MD

## 2025-02-03 NOTE — CARE PLAN
The patient's goals for the shift include      The clinical goals for the shift include Remain safe, HDS, comfortable, and manage/control pain throughout shift      Problem: Safety - Adult  Goal: Free from fall injury  Outcome: Progressing     Problem: Discharge Planning  Goal: Discharge to home or other facility with appropriate resources  Outcome: Progressing     Problem: Chronic Conditions and Co-morbidities  Goal: Patient's chronic conditions and co-morbidity symptoms are monitored and maintained or improved  Outcome: Progressing     Problem: Nutrition  Goal: Nutrient intake appropriate for maintaining nutritional needs  Outcome: Progressing     Problem: Pain  Goal: Takes deep breaths with improved pain control throughout the shift  Outcome: Progressing  Goal: Turns in bed with improved pain control throughout the shift  Outcome: Progressing  Goal: Walks with improved pain control throughout the shift  Outcome: Progressing  Goal: Performs ADL's with improved pain control throughout shift  Outcome: Progressing  Goal: Participates in PT with improved pain control throughout the shift  Outcome: Progressing  Goal: Free from opioid side effects throughout the shift  Outcome: Progressing  Goal: Free from acute confusion related to pain meds throughout the shift  Outcome: Progressing     Problem: Pain  Goal: LTG - Patient will manage pain with the appropriate technique/Intervention  Outcome: Progressing     Problem: Fall/Injury  Goal: Not fall by end of shift  Outcome: Progressing  Goal: Be free from injury by end of the shift  Outcome: Progressing  Goal: Verbalize understanding of personal risk factors for fall in the hospital  Outcome: Progressing  Goal: Verbalize understanding of risk factor reduction measures to prevent injury from fall in the home  Outcome: Progressing  Goal: Use assistive devices by end of the shift  Outcome: Progressing  Goal: Pace activities to prevent fatigue by end of the shift  Outcome:  Progressing

## 2025-02-05 ENCOUNTER — TELEPHONE (OUTPATIENT)
Dept: INPATIENT UNIT | Facility: HOSPITAL | Age: 65
End: 2025-02-05
Payer: COMMERCIAL

## 2025-02-05 DIAGNOSIS — M48.062 SPINAL STENOSIS OF LUMBAR REGION WITH NEUROGENIC CLAUDICATION: Primary | ICD-10-CM

## 2025-02-05 RX ORDER — METHOCARBAMOL 500 MG/1
TABLET, FILM COATED ORAL
Qty: 60 TABLET | Refills: 2 | Status: SHIPPED | OUTPATIENT
Start: 2025-02-05

## 2025-02-05 NOTE — TELEPHONE ENCOUNTER
Patient left a voicemail stating there was an issue with her methocarbamol script that was sent to [facility] upon discharge from [facility].  She also stated she has been having some pain on the top and bottom of her leg and buttocks.  The script order had already been fixed by the covering HO [facility].  The patient stated she already received a message stating her script was ready.  We discussed her pain, and she felt it has improved some today. I reviewed red flag signs/ symptoms and comfort measures.  Patient instructed to call if she has any further issues.  Will follow up as needed.

## 2025-02-12 ENCOUNTER — APPOINTMENT (OUTPATIENT)
Dept: ORTHOPEDIC SURGERY | Facility: CLINIC | Age: 65
End: 2025-02-12
Payer: COMMERCIAL

## 2025-02-24 ENCOUNTER — TELEPHONE (OUTPATIENT)
Dept: INPATIENT UNIT | Facility: HOSPITAL | Age: 65
End: 2025-02-24
Payer: COMMERCIAL

## 2025-02-26 ENCOUNTER — OFFICE VISIT (OUTPATIENT)
Dept: PRIMARY CARE | Facility: CLINIC | Age: 65
End: 2025-02-26
Payer: COMMERCIAL

## 2025-02-26 VITALS
TEMPERATURE: 97 F | HEART RATE: 82 BPM | OXYGEN SATURATION: 97 % | BODY MASS INDEX: 28.46 KG/M2 | WEIGHT: 171 LBS | SYSTOLIC BLOOD PRESSURE: 110 MMHG | DIASTOLIC BLOOD PRESSURE: 84 MMHG

## 2025-02-26 DIAGNOSIS — R14.0 ABDOMINAL DISTENTION: Primary | ICD-10-CM

## 2025-02-26 DIAGNOSIS — R63.5 WEIGHT GAIN: ICD-10-CM

## 2025-02-26 PROCEDURE — 3079F DIAST BP 80-89 MM HG: CPT | Performed by: NURSE PRACTITIONER

## 2025-02-26 PROCEDURE — 4004F PT TOBACCO SCREEN RCVD TLK: CPT | Performed by: NURSE PRACTITIONER

## 2025-02-26 PROCEDURE — 99213 OFFICE O/P EST LOW 20 MIN: CPT | Performed by: NURSE PRACTITIONER

## 2025-02-26 PROCEDURE — 3074F SYST BP LT 130 MM HG: CPT | Performed by: NURSE PRACTITIONER

## 2025-02-26 ASSESSMENT — COLUMBIA-SUICIDE SEVERITY RATING SCALE - C-SSRS
2. HAVE YOU ACTUALLY HAD ANY THOUGHTS OF KILLING YOURSELF?: NO
1. IN THE PAST MONTH, HAVE YOU WISHED YOU WERE DEAD OR WISHED YOU COULD GO TO SLEEP AND NOT WAKE UP?: NO
6. HAVE YOU EVER DONE ANYTHING, STARTED TO DO ANYTHING, OR PREPARED TO DO ANYTHING TO END YOUR LIFE?: NO

## 2025-02-26 ASSESSMENT — PATIENT HEALTH QUESTIONNAIRE - PHQ9
SUM OF ALL RESPONSES TO PHQ9 QUESTIONS 1 AND 2: 0
1. LITTLE INTEREST OR PLEASURE IN DOING THINGS: NOT AT ALL
2. FEELING DOWN, DEPRESSED OR HOPELESS: NOT AT ALL

## 2025-02-26 ASSESSMENT — PAIN SCALES - GENERAL: PAINLEVEL_OUTOF10: 0-NO PAIN

## 2025-02-26 NOTE — PROGRESS NOTES
Subjective   Patient ID: Alexia Gonzáles is a 64 y.o. female who presents for Follow-up (Distended abdomin with pain after surgery).    HPI     Review of Systems    Objective   /84   Pulse 82   Temp 36.1 °C (97 °F)   Wt 77.6 kg (171 lb)   SpO2 97%   BMI 28.46 kg/m²     Physical Exam    Assessment/Plan   Problem List Items Addressed This Visit    None          Vascular: No carotid bruit.   Cardiovascular:      Rate and Rhythm: Normal rate and regular rhythm.      Pulses: Normal pulses.      Heart sounds: Normal heart sounds.   Pulmonary:      Effort: Pulmonary effort is normal.      Breath sounds: Normal breath sounds.   Abdominal:      General: Bowel sounds are normal. There is no distension.      Palpations: Abdomen is soft. There is no mass.      Tenderness: There is no abdominal tenderness. There is no guarding or rebound.      Hernia: No hernia is present.   Musculoskeletal:      Cervical back: Normal range of motion and neck supple.   Lymphadenopathy:      Cervical: No cervical adenopathy.   Skin:     General: Skin is warm and dry.      Capillary Refill: Capillary refill takes less than 2 seconds.   Neurological:      Mental Status: She is alert and oriented to person, place, and time.   Psychiatric:         Mood and Affect: Mood normal.         Behavior: Behavior normal.         Thought Content: Thought content normal.         Judgment: Judgment normal.         Assessment/Plan   Problem List Items Addressed This Visit             ICD-10-CM    Weight gain R63.5     Normal abdominal exam  Reviewed CT from ER visit as she has been having abdominal pain prior to her ER visit in January  Discussed that she has gained approximately 11 pounds over the past 2 to 3 months  Red flags of when to seek immediate care discussed with patient  Incision to acute low back pain is healing well with no signs of infection  She is due for colonoscopy and order for referral to GI placed          Other Visit Diagnoses         Codes    Abdominal distention    -  Primary R14.0    Relevant Orders    Referral to Gastroenterology

## 2025-03-03 ENCOUNTER — TELEPHONE (OUTPATIENT)
Dept: ORTHOPEDIC SURGERY | Facility: HOSPITAL | Age: 65
End: 2025-03-03
Payer: COMMERCIAL

## 2025-03-03 NOTE — TELEPHONE ENCOUNTER
A voicemail with the patient this afternoon informing him of his appointment being rescheduled to 03/05/25 per Dr. Gallegos. I asked the patient to call back if that did not work with his schedule.

## 2025-03-05 ENCOUNTER — OFFICE VISIT (OUTPATIENT)
Dept: ORTHOPEDIC SURGERY | Facility: CLINIC | Age: 65
End: 2025-03-05
Payer: COMMERCIAL

## 2025-03-05 ENCOUNTER — HOSPITAL ENCOUNTER (OUTPATIENT)
Dept: RADIOLOGY | Facility: CLINIC | Age: 65
Discharge: HOME | End: 2025-03-05
Payer: COMMERCIAL

## 2025-03-05 DIAGNOSIS — M48.062 SPINAL STENOSIS OF LUMBAR REGION WITH NEUROGENIC CLAUDICATION: ICD-10-CM

## 2025-03-05 DIAGNOSIS — M54.16 LUMBAR RADICULOPATHY: ICD-10-CM

## 2025-03-05 PROBLEM — R63.5 WEIGHT GAIN: Status: ACTIVE | Noted: 2025-03-05

## 2025-03-05 PROBLEM — T14.8XXA CONTUSION OF SOFT TISSUE: Status: RESOLVED | Noted: 2025-01-04 | Resolved: 2025-03-05
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
## 2025-03-05 PROBLEM — R10.9 FLANK PAIN: Status: RESOLVED | Noted: 2025-01-04 | Resolved: 2025-03-05

## 2025-03-05 PROCEDURE — 72100 X-RAY EXAM L-S SPINE 2/3 VWS: CPT

## 2025-03-05 PROCEDURE — 99024 POSTOP FOLLOW-UP VISIT: CPT | Performed by: ORTHOPAEDIC SURGERY

## 2025-03-05 ASSESSMENT — PAIN - FUNCTIONAL ASSESSMENT: PAIN_FUNCTIONAL_ASSESSMENT: NO/DENIES PAIN

## 2025-03-05 NOTE — ASSESSMENT & PLAN NOTE
Normal abdominal exam  Reviewed CT from ER visit as she has been having abdominal pain prior to her ER visit in January  Discussed that she has gained approximately 11 pounds over the past 2 to 3 months  Red flags of when to seek immediate care discussed with patient  Incision to acute low back pain is healing well with no signs of infection  She is due for colonoscopy and order for referral to GI placed

## 2025-03-05 NOTE — PROGRESS NOTES
Alexia is nearly 6 weeks from surgery and she is really doing extremely well.  Complete relief of her severe radicular symptoms.    On exam she looks great.  Posture upright.  Incision healed.  Strength intact.    X-rays show good position of her instrumentation with maturing fusion.    She is really done well at this point.  She will begin a therapy and exercise program.  She would like to return to work.    She is in the process of weaning from gabapentin.    Will see her back in 6 weeks with plain film.    ** Dictated with voice recognition software and not immediately reviewed for errors in grammar and/or spelling **

## 2025-03-05 NOTE — LETTER
March 5, 2025     Jazmin Reece, APRN-CNP  [address] [place], [facility] [address]  [facility] [address]    Patient: Alexia Gonzáles   YOB: 1960   Date of Visit: 3/5/2025       Dear Dr. Jazmin Reece, APRN-CNP:    Thank you for referring Alexia Gonzáles to me for evaluation. Below are my notes for this consultation.  If you have questions, please do not hesitate to call me. I look forward to following your patient along with you.       Sincerely,     Kwaku Gallegos MD      CC: No Recipients  ______________________________________________________________________________________    Alexia is nearly 6 weeks from surgery and she is really doing extremely well.  Complete relief of her severe radicular symptoms.    On exam she looks great.  Posture upright.  Incision healed.  Strength intact.    X-rays show good position of her instrumentation with maturing fusion.    She is really done well at this point.  She will begin a therapy and exercise program.  She would like to return to work.    She is in the process of weaning from gabapentin.    Will see her back in 6 weeks with plain film.    ** Dictated with voice recognition software and not immediately reviewed for errors in grammar and/or spelling **

## 2025-03-10 ENCOUNTER — APPOINTMENT (OUTPATIENT)
Dept: ORTHOPEDIC SURGERY | Facility: CLINIC | Age: 65
End: 2025-03-10
Payer: COMMERCIAL

## 2025-03-12 ENCOUNTER — APPOINTMENT (OUTPATIENT)
Dept: PHYSICAL THERAPY | Facility: CLINIC | Age: 65
End: 2025-03-12
Payer: COMMERCIAL

## 2025-03-19 ENCOUNTER — APPOINTMENT (OUTPATIENT)
Dept: ORTHOPEDIC SURGERY | Facility: CLINIC | Age: 65
End: 2025-03-19
Payer: COMMERCIAL

## 2025-03-19 ENCOUNTER — DOCUMENTATION (OUTPATIENT)
Dept: ORTHOPEDICS | Facility: HOSPITAL | Age: 65
End: 2025-03-19

## 2025-03-19 NOTE — PROGRESS NOTES
I called and left the patient a message.  She had called describing pain in the left side of her low back.  I called and left a message for her to return our call.

## 2025-04-04 DIAGNOSIS — I10 ESSENTIAL HYPERTENSION: ICD-10-CM

## 2025-04-07 RX ORDER — LOSARTAN POTASSIUM 50 MG/1
50 TABLET ORAL DAILY
Qty: 90 TABLET | Refills: 1 | Status: SHIPPED | OUTPATIENT
Start: 2025-04-07 | End: 2025-04-08 | Stop reason: SDUPTHER

## 2025-04-08 DIAGNOSIS — I10 ESSENTIAL HYPERTENSION: ICD-10-CM

## 2025-04-08 RX ORDER — LOSARTAN POTASSIUM 50 MG/1
50 TABLET ORAL DAILY
Qty: 90 TABLET | Refills: 1 | Status: SHIPPED | OUTPATIENT
Start: 2025-04-08

## 2025-04-09 ENCOUNTER — EVALUATION (OUTPATIENT)
Dept: PHYSICAL THERAPY | Facility: CLINIC | Age: 65
End: 2025-04-09
Payer: COMMERCIAL

## 2025-04-09 DIAGNOSIS — M54.16 LUMBAR RADICULOPATHY: ICD-10-CM

## 2025-04-09 PROCEDURE — 97110 THERAPEUTIC EXERCISES: CPT | Mod: GP

## 2025-04-09 PROCEDURE — 97161 PT EVAL LOW COMPLEX 20 MIN: CPT | Mod: GP

## 2025-04-09 NOTE — PROGRESS NOTES
Physical Therapy Evaluation     Patient Name: Alexia Gonzáles  MRN: [id number]  Today's Date: 4/9/2025  Time Calculation  Start Time: 1336  Stop Time: 1420  Time Calculation (min): 44 min    PT Evaluation Time Entry  PT Evaluation (Low) Time Entry: 28  PT Therapeutic Procedures Time Entry  Therapeutic Exercise Time Entry: 16       Insurance:  Number of Treatments Authorized: 1/25v (DED 1650 100% COVERAGE 25 V A YR OOP 3300)          Current Problem:   1. Lumbar radiculopathy  Referral to Physical Therapy          Precautions:  Precautions  Precautions Comment: No fall risk - Fell on 1/4/25 - caught her foot on something and landed on concrete ledge. Had to go to the ER - scans/organs were fine. (L3-L5 Lumbar Spine Decompression and Fusion; L4-L5 Transforaminal Lumbar Interbody Fusion on 1/31/25 Dr. Gallegos. No lifting > 10#s.  Next POV is next week.)    Reason for visit: L3-L5 Lumbar Spine Decompression and Fusion; L4-L5 Transforaminal Lumbar Interbody Fusion on 1/31/25  Referred by: Dr. Gallegos    HPI: No AMIE, started in R leg then went into B glutes and occasionally in to left thigh.  Biggest issues was unable to amb long distances.  Had it for over a year tried injections/PT/medication without relief.  Decided to have the surgery and sx resolved immediately.      Work, mechanical stresses:  full time, 50/50% sitting/walking Returned to work about 2 weeks ago  Leisure, mechanical stresses: Owns a dog.   Functional disability since surgery/Goals for PT: None stated/Wants to be able to exercise to get healthier/lose weight.  Feels SOB going up/down stairs Walk her dog. Work on posture.    Home environment: Full flight in home, 1 step to enter   Available assist: son/daughter live close by to help PRN. Significant other can help if needed too.   Equipment/AD: Walker, cane, tub chair  Constant symptoms: none  Intermittent symptoms: LBP   Pain ranges from: 0-2/10  Pt describes pain as: soreness    Symptoms are worse with: rising, walking for long distance    Symptoms are better with: rest/lying     Objective Findings:  Outcome Measures:  Other Measures  Oswestry Disablity Index (BRIANDA): 8/50 = 16%  L/S  AROM - Nil/Min/Mod/Max loss or degrees.  Flex: 115    Ext: 3-0 stiffness   R SB; 15   L SB: 10     MMT:   Hip  Flex: R = 5/5, L = 5/5    ABd:   R = 4+/5, L = 4+/5                               IR: R = 5/5, L = 5/5  ER:  R = 4+/5, L = 4+/5  Knee   Flex: R = 5/5, L = 5/5    Ext: R = 5/5, L = 5/5    Gait deviations: nil    Treatment:   - Supine Double Knee to Chest  x 10  - R, L Sidelying Thoracic Rotation with Open Book  x 10   - Prone Press Up on Elbows  x 60 sec  - Standing Hip Abduction with Counter Support  x 10   - Mini Squat with Counter Support  x 10  - Standing March with Counter Support  x 10   - Standing Hip Extension with Counter Support  x 10   - Heel Raises with Counter Support  x 10  - Lunge with Counter Support  x 10  - Seated Correct Posture x 2 min     Educated pt on proper response to exercise, produce/increase - NW principle, and healing process.  Issued handout for HEP  HEP/med bridge:   Date: 04/09/2025  Prepared by: Micky Arredondo  Program Notes - Do bottom step of 8-10 minutes, monitor heart rate go for walks x 1-2 a day.May be sore for about 10-15 minutes after stretching but should not be painful   Exercises  - Supine Double Knee to Chest  - 3-5 x daily - 7 x weekly - 1 sets - 10 reps  - Sidelying Thoracic Rotation with Open Book  - 3-5 x daily - 7 x weekly - 1 sets - 10 reps  - Prone Press Up on Elbows  - 3-5 x daily - 7 x weekly - 1 sets - 1-2 reps - 60 hold  - Seated Correct Posture   - Standing Hip Abduction with Counter Support  - 1 x daily - 7 x weekly - 2-3 sets - 10-20 reps  - Mini Squat with Counter Support  - 1 x daily - 7 x weekly - 2-3 sets - 10-20 reps  - Standing March with Counter Support  - 1 x daily - 7 x weekly - 2-3 sets - 10-20 reps  - Standing Hip Extension with  Counter Support  - 1 x daily - 7 x weekly - 2-3 sets - 10-20 reps  - Heel Raises with Counter Support  - 1 x daily - 7 x weekly - 2-3 sets - 10-20 reps  - Lunge with Counter Support  - 1 x daily - 7 x weekly - 2-3 sets - 10-20 reps    Assessment: Pt presents to PT with complaint of LBP and diminished endurance/SOB s/p L3-L5 Lumbar Spine Decompression and Fusion; L4-L5 Transforaminal Lumbar Interbody Fusion on 1/31/25 by Dr. Gallegos. Pt will benefit from skilled PT to address ROM/strength/functional limitations and pain noted during evaluation today.    Plan of Care:  Problem List: Pain, Functional limitations, activity limitations, ROM limitations, Posture, Gait, Transfer, Activity Limitations, IADLS/ADLS/Self care  Goals:  STG:  Pain = 0/10 LBP by week 3  Pt will be compliant with HEP by week 1  Pt will be able to amb >/= 5 minutes without increase in LBP by week 3  LTG:  L/S ROM = WFLs in all directions by week 6  Pt will ahcieve a clinically significant improvement in BRIANDA by week 6  Pt will report >/= 80% improvement/progress towards PT goals by week 6  Pt will be able to walk her dog >/= 10 min without limitation from endurance/SOB by week 6  Pt will be able to go up/down full flight of stairs without SOB/UE assist by week 6  Planned interventions: Ther ex, Neuromuscular re-ed, ther act, Manual, Education, Home program, Estim, Hot therapy, Cold therapy, Vaso  Planned visits: x 1 a week for 6 weeks for 6 visits   The POC was created, discussed, and agreed on with the patient.

## 2025-04-16 ENCOUNTER — APPOINTMENT (OUTPATIENT)
Dept: ORTHOPEDIC SURGERY | Facility: CLINIC | Age: 65
End: 2025-04-16
Payer: COMMERCIAL

## 2025-04-21 ENCOUNTER — DOCUMENTATION (OUTPATIENT)
Dept: PHYSICAL THERAPY | Facility: CLINIC | Age: 65
End: 2025-04-21
Payer: COMMERCIAL

## 2025-04-24 ENCOUNTER — OFFICE VISIT (OUTPATIENT)
Dept: PRIMARY CARE | Facility: CLINIC | Age: 65
End: 2025-04-24
Payer: COMMERCIAL

## 2025-04-24 ENCOUNTER — CLINICAL SUPPORT (OUTPATIENT)
Dept: PRIMARY CARE | Facility: CLINIC | Age: 65
End: 2025-04-24
Payer: COMMERCIAL

## 2025-04-24 VITALS
HEIGHT: 65 IN | OXYGEN SATURATION: 96 % | DIASTOLIC BLOOD PRESSURE: 82 MMHG | WEIGHT: 177 LBS | SYSTOLIC BLOOD PRESSURE: 178 MMHG | HEART RATE: 83 BPM | TEMPERATURE: 96.6 F | BODY MASS INDEX: 29.49 KG/M2

## 2025-04-24 DIAGNOSIS — I10 ESSENTIAL HYPERTENSION: ICD-10-CM

## 2025-04-24 DIAGNOSIS — M54.6 RIGHT-SIDED THORACIC BACK PAIN, UNSPECIFIED CHRONICITY: ICD-10-CM

## 2025-04-24 DIAGNOSIS — M54.6 RIGHT-SIDED THORACIC BACK PAIN, UNSPECIFIED CHRONICITY: Primary | ICD-10-CM

## 2025-04-24 PROCEDURE — 3008F BODY MASS INDEX DOCD: CPT | Performed by: STUDENT IN AN ORGANIZED HEALTH CARE EDUCATION/TRAINING PROGRAM

## 2025-04-24 PROCEDURE — 3079F DIAST BP 80-89 MM HG: CPT | Performed by: STUDENT IN AN ORGANIZED HEALTH CARE EDUCATION/TRAINING PROGRAM

## 2025-04-24 PROCEDURE — 3077F SYST BP >= 140 MM HG: CPT | Performed by: STUDENT IN AN ORGANIZED HEALTH CARE EDUCATION/TRAINING PROGRAM

## 2025-04-24 PROCEDURE — 99214 OFFICE O/P EST MOD 30 MIN: CPT | Performed by: STUDENT IN AN ORGANIZED HEALTH CARE EDUCATION/TRAINING PROGRAM

## 2025-04-24 RX ORDER — LIDOCAINE 50 MG/G
1 PATCH TOPICAL DAILY
Qty: 30 PATCH | Refills: 0 | Status: SHIPPED | OUTPATIENT
Start: 2025-04-24 | End: 2026-04-24

## 2025-04-24 RX ORDER — KETOROLAC TROMETHAMINE 30 MG/ML
30 INJECTION, SOLUTION INTRAMUSCULAR; INTRAVENOUS ONCE
Status: DISCONTINUED | OUTPATIENT
Start: 2025-04-24 | End: 2025-04-24

## 2025-04-24 RX ORDER — IBUPROFEN 800 MG/1
800 TABLET ORAL EVERY 8 HOURS PRN
COMMUNITY

## 2025-04-24 RX ORDER — METHYLPREDNISOLONE 4 MG/1
TABLET ORAL
Qty: 21 TABLET | Refills: 0 | Status: SHIPPED | OUTPATIENT
Start: 2025-04-24 | End: 2025-04-30

## 2025-04-24 RX ORDER — KETOROLAC TROMETHAMINE 30 MG/ML
30 INJECTION, SOLUTION INTRAMUSCULAR; INTRAVENOUS ONCE
Status: CANCELLED | OUTPATIENT
Start: 2025-04-24 | End: 2025-04-24

## 2025-04-24 RX ORDER — KETOROLAC TROMETHAMINE 30 MG/ML
30 INJECTION, SOLUTION INTRAMUSCULAR; INTRAVENOUS ONCE
Status: COMPLETED | OUTPATIENT
Start: 2025-04-24 | End: 2025-04-24

## 2025-04-24 RX ORDER — KETOROLAC TROMETHAMINE 30 MG/ML
60 INJECTION, SOLUTION INTRAMUSCULAR; INTRAVENOUS ONCE
Status: CANCELLED | OUTPATIENT
Start: 2025-04-24 | End: 2025-04-24

## 2025-04-24 RX ADMIN — KETOROLAC TROMETHAMINE 30 MG: 30 INJECTION, SOLUTION INTRAMUSCULAR; INTRAVENOUS at 15:30

## 2025-04-24 ASSESSMENT — PAIN SCALES - GENERAL: PAINLEVEL_OUTOF10: 10-WORST PAIN EVER

## 2025-04-24 NOTE — PROGRESS NOTES
"Subjective   Alexia Gonzáles is a 64 y.o. female who presents for back pain.    HPI:    ***    Spinal fusion in January.  Did one session of physical therapy, but was laying around a lot    R thoracic back pain  Ibuprofen,methocarbamol, biofreeze    Had to take off work yesterday and today due to pain.      Went to chiropracter this week    Back pain - follows with ortho, last seen by Kwaku Gallegos MD on 3/5/2025, follow up recommended in 6 weeks.  No  upcoming appointment noted.    Pain med appt ([facility] on 6/9/2025.    ROS:   Review of systems is essentially negative for all systems except for any identified issues in HPI above.    Objective     /82   Pulse 83   Temp 35.9 °C (96.6 °F)   Ht 1.651 m (5' 5\")   Wt 80.3 kg (177 lb)   SpO2 96%   BMI 29.45 kg/m²      PHYSICAL EXAM    GENERAL  Well-appearing, pleasant and cooperative.  No acute distress.    HEENT  HEAD:   Normocephalic.  Atraumatic.  EYES:  PERRLA.  No scleral icterus or conjunctival injection.  EARS:  Tympanic membranes visualized bilaterally without erythema, fluid, or bulging.  NECK:  No adenopathy.  No palpable thyroid enlargement or nodules.    THROAT:  Moist oropharynx without tonsillar enlargement or exudates.    LUNGS:    Clear to auscultation bilaterally.  No wheezes, rales, rhonchi.    CARDIAC:  Regular rate and rhythm.  Normal S1S2.  No murmurs/rubs/gallops.    ABDOMEN:  Soft, non-tender, non-distended.  No hepatosplenomegaly.  Normoactive bowel sounds.    MUSCULOSKELETAL:  No gross abnormalities.   No joint swelling or erythema,.  No spinal or paraspinal tenderness to palpation.    EXTREMITIES:  No LE edema or cyanosis.      NEURO           Alert and oriented x3. No focal deficits.    PSYCH:          Affect appropriate.           Assessment/Plan   Problem List Items Addressed This Visit    None           Natasha Sigala MD    "     Assessment/Plan   Problem List Items Addressed This Visit       Essential hypertension    Elevated in clinic today - likely partially due to severe pain.  Will take additional losartan 50 mg today and follow up for BP check/pain management review.          Other Visit Diagnoses         Right-sided thoracic back pain, unspecified chronicity    -  Primary    Relevant Medications    methylPREDNISolone (Medrol Dospak) 4 mg tablets    lidocaine (Lidoderm) 5 % patch    ketorolac (Toradol) injection 30 mg (Completed)          Counseling:   Medication education:    Education: The patient is counseled regarding potential side effects of all new medications.    Understanding:  Patient expressed understanding.    Adherence:  No barriers to adherence identified.         Natasha Sigala MD

## 2025-04-30 ENCOUNTER — TELEPHONE (OUTPATIENT)
Dept: PRIMARY CARE | Facility: CLINIC | Age: 65
End: 2025-04-30
Payer: COMMERCIAL

## 2025-04-30 NOTE — TELEPHONE ENCOUNTER
Pt was seen last week by PCP and states she was given ketorolac injection for back pain. Pt is asking if she is able to come in for another injection as she is still having the back pain and it is causing difficulty at work. Pt states she is still taking the muscle relaxor.

## 2025-05-01 NOTE — TELEPHONE ENCOUNTER
Call placed to pt. No answer, lvm advising pt she can come in for injection as long as she has not had any other recent doses of other NSAIDS advised pt via vm to call the office back she still wants to get scheduled on Nurses Visits

## 2025-05-01 NOTE — ASSESSMENT & PLAN NOTE
Elevated in clinic today - likely partially due to severe pain.  Will take additional losartan 50 mg today and follow up for BP check/pain management review.

## 2025-05-01 NOTE — TELEPHONE ENCOUNTER
Okay to come in for nurse visit for Toradol 30 mg injection.  As long as she has not had any recent doses of other NSAIDs such as ibuprofen, naproxen.

## 2025-05-02 NOTE — TELEPHONE ENCOUNTER
Call placed to patient, no answer. Left message for patient to call back to schedule nurse visit as long as she has had no recent dose of NSAIDS.

## 2025-05-17 ENCOUNTER — HOSPITAL ENCOUNTER (EMERGENCY)
Facility: HOSPITAL | Age: 65
Discharge: HOME | End: 2025-05-18
Attending: EMERGENCY MEDICINE
Payer: COMMERCIAL

## 2025-05-17 ENCOUNTER — APPOINTMENT (OUTPATIENT)
Dept: RADIOLOGY | Facility: HOSPITAL | Age: 65
End: 2025-05-17
Payer: COMMERCIAL

## 2025-05-17 VITALS
DIASTOLIC BLOOD PRESSURE: 97 MMHG | HEART RATE: 71 BPM | OXYGEN SATURATION: 98 % | SYSTOLIC BLOOD PRESSURE: 145 MMHG | RESPIRATION RATE: 16 BRPM | TEMPERATURE: 98.6 F | BODY MASS INDEX: 29.16 KG/M2 | WEIGHT: 175 LBS | HEIGHT: 65 IN

## 2025-05-17 DIAGNOSIS — F32.A ANXIETY AND DEPRESSION: ICD-10-CM

## 2025-05-17 DIAGNOSIS — M54.6 ACUTE LEFT-SIDED THORACIC BACK PAIN: Primary | ICD-10-CM

## 2025-05-17 DIAGNOSIS — F41.9 ANXIETY AND DEPRESSION: ICD-10-CM

## 2025-05-17 LAB
ALBUMIN SERPL BCP-MCNC: 3.8 G/DL (ref 3.4–5)
ALP SERPL-CCNC: 95 U/L (ref 33–136)
ALT SERPL W P-5'-P-CCNC: 9 U/L (ref 7–45)
ANION GAP SERPL CALCULATED.3IONS-SCNC: 11 MMOL/L (ref 10–20)
APPEARANCE UR: ABNORMAL
AST SERPL W P-5'-P-CCNC: 14 U/L (ref 9–39)
BACTERIA #/AREA URNS AUTO: ABNORMAL /HPF
BASOPHILS # BLD AUTO: 0.06 X10*3/UL (ref 0–0.1)
BASOPHILS NFR BLD AUTO: 0.7 %
BILIRUB SERPL-MCNC: 0.4 MG/DL (ref 0–1.2)
BILIRUB UR STRIP.AUTO-MCNC: NEGATIVE MG/DL
BUN SERPL-MCNC: 12 MG/DL (ref 6–23)
CALCIUM SERPL-MCNC: 9.2 MG/DL (ref 8.6–10.3)
CHLORIDE SERPL-SCNC: 111 MMOL/L (ref 98–107)
CO2 SERPL-SCNC: 22 MMOL/L (ref 21–32)
COLOR UR: ABNORMAL
CREAT SERPL-MCNC: 0.66 MG/DL (ref 0.5–1.05)
EGFRCR SERPLBLD CKD-EPI 2021: >90 ML/MIN/1.73M*2
EOSINOPHIL # BLD AUTO: 0.19 X10*3/UL (ref 0–0.7)
EOSINOPHIL NFR BLD AUTO: 2.2 %
ERYTHROCYTE [DISTWIDTH] IN BLOOD BY AUTOMATED COUNT: 15.8 % (ref 11.5–14.5)
GLUCOSE SERPL-MCNC: 111 MG/DL (ref 74–99)
GLUCOSE UR STRIP.AUTO-MCNC: NORMAL MG/DL
HCT VFR BLD AUTO: 35.8 % (ref 36–46)
HGB BLD-MCNC: 11.4 G/DL (ref 12–16)
IMM GRANULOCYTES # BLD AUTO: 0.03 X10*3/UL (ref 0–0.7)
IMM GRANULOCYTES NFR BLD AUTO: 0.3 % (ref 0–0.9)
KETONES UR STRIP.AUTO-MCNC: NEGATIVE MG/DL
LEUKOCYTE ESTERASE UR QL STRIP.AUTO: ABNORMAL
LYMPHOCYTES # BLD AUTO: 1.94 X10*3/UL (ref 1.2–4.8)
LYMPHOCYTES NFR BLD AUTO: 22.4 %
MCH RBC QN AUTO: 24.6 PG (ref 26–34)
MCHC RBC AUTO-ENTMCNC: 31.8 G/DL (ref 32–36)
MCV RBC AUTO: 77 FL (ref 80–100)
MONOCYTES # BLD AUTO: 0.7 X10*3/UL (ref 0.1–1)
MONOCYTES NFR BLD AUTO: 8.1 %
MUCOUS THREADS #/AREA URNS AUTO: ABNORMAL /LPF
NEUTROPHILS # BLD AUTO: 5.76 X10*3/UL (ref 1.2–7.7)
NEUTROPHILS NFR BLD AUTO: 66.3 %
NITRITE UR QL STRIP.AUTO: NEGATIVE
NRBC BLD-RTO: 0 /100 WBCS (ref 0–0)
PH UR STRIP.AUTO: 5.5 [PH]
PLATELET # BLD AUTO: 304 X10*3/UL (ref 150–450)
POTASSIUM SERPL-SCNC: 3.6 MMOL/L (ref 3.5–5.3)
PROT SERPL-MCNC: 6.5 G/DL (ref 6.4–8.2)
PROT UR STRIP.AUTO-MCNC: NEGATIVE MG/DL
RBC # BLD AUTO: 4.64 X10*6/UL (ref 4–5.2)
RBC # UR STRIP.AUTO: NEGATIVE MG/DL
RBC #/AREA URNS AUTO: ABNORMAL /HPF
SODIUM SERPL-SCNC: 140 MMOL/L (ref 136–145)
SP GR UR STRIP.AUTO: 1.02
SQUAMOUS #/AREA URNS AUTO: ABNORMAL /HPF
UROBILINOGEN UR STRIP.AUTO-MCNC: NORMAL MG/DL
WBC # BLD AUTO: 8.7 X10*3/UL (ref 4.4–11.3)
WBC #/AREA URNS AUTO: ABNORMAL /HPF

## 2025-05-17 PROCEDURE — 81001 URINALYSIS AUTO W/SCOPE: CPT | Performed by: EMERGENCY MEDICINE

## 2025-05-17 PROCEDURE — 87086 URINE CULTURE/COLONY COUNT: CPT | Mod: WESLAB | Performed by: EMERGENCY MEDICINE

## 2025-05-17 PROCEDURE — 85025 COMPLETE CBC W/AUTO DIFF WBC: CPT | Performed by: EMERGENCY MEDICINE

## 2025-05-17 PROCEDURE — 80053 COMPREHEN METABOLIC PANEL: CPT | Performed by: EMERGENCY MEDICINE

## 2025-05-17 PROCEDURE — 71046 X-RAY EXAM CHEST 2 VIEWS: CPT | Mod: FOREIGN READ | Performed by: RADIOLOGY

## 2025-05-17 PROCEDURE — 2500000001 HC RX 250 WO HCPCS SELF ADMINISTERED DRUGS (ALT 637 FOR MEDICARE OP): Performed by: EMERGENCY MEDICINE

## 2025-05-17 PROCEDURE — 71046 X-RAY EXAM CHEST 2 VIEWS: CPT

## 2025-05-17 PROCEDURE — 36415 COLL VENOUS BLD VENIPUNCTURE: CPT | Performed by: EMERGENCY MEDICINE

## 2025-05-17 PROCEDURE — 99285 EMERGENCY DEPT VISIT HI MDM: CPT | Mod: 25 | Performed by: EMERGENCY MEDICINE

## 2025-05-17 RX ORDER — OXYCODONE AND ACETAMINOPHEN 5; 325 MG/1; MG/1
1 TABLET ORAL ONCE
Refills: 0 | Status: COMPLETED | OUTPATIENT
Start: 2025-05-17 | End: 2025-05-17

## 2025-05-17 RX ORDER — KETOROLAC TROMETHAMINE 15 MG/ML
15 INJECTION, SOLUTION INTRAMUSCULAR; INTRAVENOUS ONCE
Status: COMPLETED | OUTPATIENT
Start: 2025-05-17 | End: 2025-05-18

## 2025-05-17 RX ADMIN — OXYCODONE HYDROCHLORIDE AND ACETAMINOPHEN 1 TABLET: 5; 325 TABLET ORAL at 23:24

## 2025-05-17 ASSESSMENT — LIFESTYLE VARIABLES
EVER HAD A DRINK FIRST THING IN THE MORNING TO STEADY YOUR NERVES TO GET RID OF A HANGOVER: NO
TOTAL SCORE: 0
EVER FELT BAD OR GUILTY ABOUT YOUR DRINKING: NO
HAVE YOU EVER FELT YOU SHOULD CUT DOWN ON YOUR DRINKING: NO
HAVE PEOPLE ANNOYED YOU BY CRITICIZING YOUR DRINKING: NO

## 2025-05-17 ASSESSMENT — PAIN DESCRIPTION - PAIN TYPE: TYPE: ACUTE PAIN

## 2025-05-17 ASSESSMENT — PAIN DESCRIPTION - PROGRESSION: CLINICAL_PROGRESSION: NOT CHANGED

## 2025-05-17 ASSESSMENT — PAIN DESCRIPTION - LOCATION: LOCATION: OTHER (COMMENT)

## 2025-05-17 ASSESSMENT — PAIN SCALES - GENERAL: PAINLEVEL_OUTOF10: 10 - WORST POSSIBLE PAIN

## 2025-05-17 ASSESSMENT — PAIN - FUNCTIONAL ASSESSMENT: PAIN_FUNCTIONAL_ASSESSMENT: 0-10

## 2025-05-18 ENCOUNTER — APPOINTMENT (OUTPATIENT)
Dept: RADIOLOGY | Facility: HOSPITAL | Age: 65
End: 2025-05-18
Payer: COMMERCIAL

## 2025-05-18 LAB — HOLD SPECIMEN: NORMAL

## 2025-05-18 PROCEDURE — 96374 THER/PROPH/DIAG INJ IV PUSH: CPT

## 2025-05-18 PROCEDURE — 2500000004 HC RX 250 GENERAL PHARMACY W/ HCPCS (ALT 636 FOR OP/ED): Mod: JZ | Performed by: EMERGENCY MEDICINE

## 2025-05-18 PROCEDURE — 74176 CT ABD & PELVIS W/O CONTRAST: CPT | Performed by: RADIOLOGY

## 2025-05-18 PROCEDURE — 74176 CT ABD & PELVIS W/O CONTRAST: CPT

## 2025-05-18 RX ADMIN — KETOROLAC TROMETHAMINE 15 MG: 15 INJECTION, SOLUTION INTRAMUSCULAR; INTRAVENOUS at 00:19

## 2025-05-18 NOTE — ED PROVIDER NOTES
EMERGENCY DEPARTMENT ENCOUNTER      Pt Name: Alexia Gonzáles  MRN: [id number]  Birthdate 1960  Date of evaluation: 5/17/2025  ED Provider: Misty Mei DO     CHIEF COMPLAINT       Chief Complaint   Patient presents with    Flank Pain     Pt comes in complaining of flank pain for the 2-3 weeks. Pt states the pain has been constant       HISTORY OF PRESENT ILLNESS    Alexia Gonzáles is a 64 y.o. who presents to the emergency department with complaints of left flank pain.  This been going on for the past 2 to 3 weeks.  She cannot recall any injury or insult that may have caused it.  In January she underwent a back surgery but has been healing well from that standpoint.  She saw her primary care who diagnosed her with a sprain and prescribed muscle relaxers and some tramadol.  She has also tried lidocaine, ibuprofen, chiropractor and massage with no improvement in her symptoms.  She denies any urinary symptoms including any dysuria or hematuria.  Nothing seems to make this any better or worse.    REVIEW OF SYSTEMS     Focused ROS performed and negative other than as listed in HPI    PAST MEDICAL HISTORY   Medical History[1]    SURGICAL HISTORY     Surgical History[2]    CURRENT MEDICATIONS       Discharge Medication List as of 5/18/2025  1:53 AM        CONTINUE these medications which have NOT CHANGED    Details   gabapentin (Neurontin) 300 mg capsule Take 1 capsule (300 mg) by mouth 3 times a day., Starting Tue 8/27/2024, Until Wed 8/27/2025, Normal      ibuprofen 800 mg tablet Take 1 tablet (800 mg) by mouth every 8 hours if needed for mild pain (1 - 3)., Historical Med      lidocaine (Lidoderm) 5 % patch Place 1 patch over 12 hours on the skin once daily. Apply to painful area 12 hours per day, remove for 12 hours., Starting Thu 4/24/2025, Until Fri 4/24/2026, Normal      losartan (Cozaar) 50 mg tablet Take 1 tablet (50 mg) by mouth once daily., Starting Tue 4/8/2025, Normal      methocarbamol (Robaxin) 500 mg  tablet Take 1-2 tabs every 8 hours as needed for spasms, Normal      sertraline (Zoloft) 50 mg tablet Take 1 tablet (50 mg) by mouth once daily., Starting Tue 1/28/2025, Normal             ALLERGIES     Patient has no known allergies.    FAMILY HISTORY     Family History[3]     SOCIAL HISTORY     Social History[4]    PHYSICAL EXAM       ED Triage Vitals [05/17/25 2222]   Temperature Heart Rate Respirations BP   37 °C (98.6 °F) 71 16 (!) 145/97      Pulse Ox Temp Source Heart Rate Source Patient Position   98 % Temporal -- --      BP Location FiO2 (%)     -- --       General: Appears well, no acute distress, alert  Head: Head atraumatic; normocephalic  Eyes: normal inspection; no icterus  ENT: mucosa moist without lesion  Neck: Normal inspection, no meningeal signs  Resp: Normal breath sounds, no wheeze or crackles; No respiratory distress  Chest Wall: no tenderness or deformity  Heart: Heart rate and rhythm regular; No Murmurs  Abdomen: Soft, Non-tender; No distention, guarding, rigidity, or rebound  MSK: Normal appearance; Moves all extremities; No Pedal edema; no midline spinal tenderness, previous incisional scar on the mid lower thoracic upper lumbar area appears to be intact and healing well, she does have tenderness to the medial and mid scapular mid back around the lower thoracic area  Neuro: Alert; no focal deficits, moves all extremities  Psych: Mood and Affect normal  Skin: Color appropriate; warm; Dry    DIAGNOSTIC RESULTS   Lab and radiology results are independently interpreted unless noted below.  RADIOLOGY (Per Emergency Physician):     Interpretation per the Radiologist below, if available at the time of this note:  CT abdomen pelvis wo IV contrast   Final Result   No acute abdominal or pelvic process.   No CT evidence of obstructive uropathy.        MACRO:   None.        Signed by: Evan Finkelstein 5/18/2025 12:49 AM   Dictation workstation:   [id number]      XR chest 2 views   Final Result   No  acute cardiopulmonary disease.   Signed by Roel Zhang          LABS:  Abnormal Labs Reviewed   URINALYSIS WITH REFLEX CULTURE AND MICROSCOPIC - Abnormal; Notable for the following components:       Result Value    Appearance, Urine Turbid (*)     Leukocyte Esterase, Urine 75 Natalee/uL (*)     All other components within normal limits   MICROSCOPIC ONLY, URINE - Abnormal; Notable for the following components:    WBC, Urine 6-10 (*)     Bacteria, Urine 1+ (*)     All other components within normal limits   CBC WITH AUTO DIFFERENTIAL - Abnormal; Notable for the following components:    Hemoglobin 11.4 (*)     Hematocrit 35.8 (*)     MCV 77 (*)     MCH 24.6 (*)     MCHC 31.8 (*)     RDW 15.8 (*)     All other components within normal limits   COMPREHENSIVE METABOLIC PANEL - Abnormal; Notable for the following components:    Glucose 111 (*)     Chloride 111 (*)     All other components within normal limits       All other labs were within normal range or not returned as of this dictation.    EMERGENCY DEPARTMENT COURSE/MDM   Patient presents with atraumatic left flank pain.  Given type of symptoms I will suspicion for nephrolithiasis.  She also has no urinary symptoms.  However as current therapies are not been helping we will rule this out.  CT scan is obtained and she is provided pain control.  Laboratory workup is initiated as well.  She is agreeable with this plan of care.      ED Course as of 05/18/25 0647   Sun May 18, 2025   0152 Lab work returned showing 1+ bacteria and slight white blood cells however it is contaminated with squamous epithelial.  Given that there is no urinary symptoms we will send for culture but will defer treatment at this time.  CT scan returned showing no acute intra-abdominal process.Patient does have a very slight anemia that is not contributing to today's current symptoms.  Patient is updated on findings.  At this time she is to follow-up with her primary care specialist.  Patient does  request a Percocet for discharge however advised that opiate pain medicine is not indicated given absence of acute findings warranting strong narcotic therapy.  She verbalized understanding and agrees with plan of discharge.  Discharged in stable condition. [EF]      ED Course User Index  [EF] Misty Mei DO         Diagnoses as of 05/18/25 0647   Acute left-sided thoracic back pain       Meds Administered:  Medications   ketorolac (Toradol) injection 15 mg (15 mg intravenous Given 5/18/25 0019)   oxyCODONE-acetaminophen (Percocet) 5-325 mg per tablet 1 tablet (1 tablet oral Given 5/17/25 0834)       PROCEDURES   Unless otherwise noted below, none  Procedures      FINAL IMPRESSION      1. Acute left-sided thoracic back pain          DISPOSITION    Discharge 05/18/2025 01:53:13 AM  As a result of the work-up, the patient was discharged home.  she was informed of her diagnosis and instructed to come back with any concerns or worsening of condition.  she and was agreeable to the plan as discussed above.  she was given the opportunity to ask questions.  All of the patient's questions were answered.    Critical Care time: Not Indicated    (Comment: Please note this report has been produced using speech recognition software and may contain errors related to that system including errors in grammar, punctuation, and spelling, as well as words and phrases that may be inappropriate.  If there are any questions or concerns please feel free to contact the dictating provider for clarification.)    Misty Mei DO, MPH (electronically signed)  Emergency Medicine Physician    History provided by: Patient  Limitations to History: None  External Records Reviewed with Brief Summary: Outpatient progress note from 4/24/25 which showed patient was seen for right-sided thoracic back pain and was prescribed a Medrol Dosepak with Toradol and lidocaine patches  Social Determinants of Health which Significantly Impact Care: None  identified   EKG Independent Interpretation: EKG not obtained  Independent Result Review and Interpretation: Relevant laboratory and radiographic results were reviewed and independently interpreted by myself.  As necessary, they are commented on in the ED Course.  Chronic conditions affecting the patient's care: As documented above in MDM  The patient was discussed with the following consultants/services: None  Care Considerations: As documented above in MDM                 [1]   Past Medical History:  Diagnosis Date    Arthritis 2020    Back pain     Chronic pain disorder     Contusion of soft tissue 2025    Depression     Fall 2025    Flank pain 2025    Hypertension     Low back pain     Spinal stenosis    [2]   Past Surgical History:  Procedure Laterality Date    BACK SURGERY       SECTION, CLASSIC      CHOLECYSTECTOMY      LUMBAR FUSION  2025    L3-L5 lumbar spine decompression and fusion; L4-L5 transforaminal lumbar interbody fusion -- with Dr. Gallegos   [3]   Family History  Problem Relation Name Age of Onset    Blood clot Mother Beklys     Hypertension Mother Belkys     Leukemia Father Don     Hypertension Father Don     Cancer Father Don    [4]   Social History  Socioeconomic History    Marital status:    Tobacco Use    Smoking status: Former     Current packs/day: 0.00     Average packs/day: 0.8 packs/day for 48.0 years (36.0 ttl pk-yrs)     Types: Cigarettes     Start date:      Quit date: 2025     Years since quittin.3     Passive exposure: Current    Smokeless tobacco: Former   Vaping Use    Vaping status: Never Used   Substance and Sexual Activity    Alcohol use: Yes     Alcohol/week: 1.0 standard drink of alcohol     Types: 1 Glasses of wine per week     Comment: Ocassionly    Drug use: Yes     Types: Marijuana     Comment: Occasionally    Sexual activity: Yes     Partners: Male     Birth control/protection: None     Social Drivers of Health     Financial  Resource Strain: Low Risk  (2/3/2025)    Overall Financial Resource Strain (CARDIA)     Difficulty of Paying Living Expenses: Not hard at all   Food Insecurity: No Food Insecurity (2/3/2025)    Hunger Vital Sign     Worried About Running Out of Food in the Last Year: Never true     Ran Out of Food in the Last Year: Never true   Transportation Needs: No Transportation Needs (2/3/2025)    PRAPARE - Transportation     Lack of Transportation (Medical): No     Lack of Transportation (Non-Medical): No   Social Connections: Unknown (2/3/2025)    Social Connection and Isolation Panel [NHANES]     Marital Status:    Intimate Partner Violence: Patient Unable To Answer (1/31/2025)    Humiliation, Afraid, Rape, and Kick questionnaire     Fear of Current or Ex-Partner: Patient unable to answer     Emotionally Abused: Patient unable to answer     Physically Abused: Patient unable to answer     Sexually Abused: Patient unable to answer   Housing Stability: Low Risk  (2/3/2025)    Housing Stability Vital Sign     Unable to Pay for Housing in the Last Year: No     Number of Times Moved in the Last Year: 0     Homeless in the Last Year: No        Misty Mei DO  05/18/25 0649

## 2025-05-19 LAB — BACTERIA UR CULT: NO GROWTH

## 2025-05-19 RX ORDER — SERTRALINE HYDROCHLORIDE 50 MG/1
50 TABLET, FILM COATED ORAL DAILY
Qty: 90 TABLET | Refills: 0 | Status: SHIPPED | OUTPATIENT
Start: 2025-05-19

## 2025-05-28 DIAGNOSIS — M48.062 SPINAL STENOSIS OF LUMBAR REGION WITH NEUROGENIC CLAUDICATION: ICD-10-CM

## 2025-05-29 RX ORDER — METHOCARBAMOL 500 MG/1
TABLET, FILM COATED ORAL
Qty: 60 TABLET | Refills: 0 | Status: SHIPPED | OUTPATIENT
Start: 2025-05-29

## 2025-06-09 ENCOUNTER — PREP FOR PROCEDURE (OUTPATIENT)
Facility: CLINIC | Age: 65
End: 2025-06-09

## 2025-06-09 ENCOUNTER — OFFICE VISIT (OUTPATIENT)
Facility: CLINIC | Age: 65
End: 2025-06-09
Payer: COMMERCIAL

## 2025-06-09 VITALS
BODY MASS INDEX: 28.49 KG/M2 | HEIGHT: 65 IN | SYSTOLIC BLOOD PRESSURE: 124 MMHG | DIASTOLIC BLOOD PRESSURE: 80 MMHG | RESPIRATION RATE: 22 BRPM | HEART RATE: 70 BPM | WEIGHT: 171 LBS | OXYGEN SATURATION: 98 %

## 2025-06-09 DIAGNOSIS — M54.14 THORACIC RADICULOPATHY: Primary | ICD-10-CM

## 2025-06-09 PROCEDURE — 3008F BODY MASS INDEX DOCD: CPT | Performed by: ANESTHESIOLOGY

## 2025-06-09 PROCEDURE — 3074F SYST BP LT 130 MM HG: CPT | Performed by: ANESTHESIOLOGY

## 2025-06-09 PROCEDURE — 99214 OFFICE O/P EST MOD 30 MIN: CPT | Performed by: ANESTHESIOLOGY

## 2025-06-09 PROCEDURE — 4004F PT TOBACCO SCREEN RCVD TLK: CPT | Performed by: ANESTHESIOLOGY

## 2025-06-09 PROCEDURE — 3079F DIAST BP 80-89 MM HG: CPT | Performed by: ANESTHESIOLOGY

## 2025-06-09 ASSESSMENT — ENCOUNTER SYMPTOMS
ACTIVITY CHANGE: 1
BACK PAIN: 1

## 2025-06-09 ASSESSMENT — PAIN - FUNCTIONAL ASSESSMENT: PAIN_FUNCTIONAL_ASSESSMENT: 0-10

## 2025-06-09 ASSESSMENT — PAIN SCALES - GENERAL
PAINLEVEL_OUTOF10: 9
PAINLEVEL_OUTOF10: 9

## 2025-06-09 ASSESSMENT — PAIN DESCRIPTION - DESCRIPTORS: DESCRIPTORS: ACHING;BURNING

## 2025-06-09 NOTE — PROGRESS NOTES
The patient is a 64-year-old male lower thoracic spine that radiates to the left the pain is constant but worse with activity.  She can some like the rest.  The low thoracic pain is severe and interfering with her quality of life.  The patient went to the emergency department.  She has seen a chiropractor.  The chiropractic treatments were ineffective.  She obtained short-term relief with a methylprednisolone therapy pack that was prescribed by Dr. Sigala.  She is here to discuss her options for treatment.Incidentally, the patient is recovering quite well from her laminectomy and fusion from L3-L5 that was performed by Dr. Gallegos earlier this year.  The patient is pleased with her response to the operation.      Review of Systems   Constitutional:  Positive for activity change.   Musculoskeletal:  Positive for back pain and gait problem.   All other systems reviewed and are negative.    GENERAL: alert and appropriate, in no distress, well-hydrated, well-nourished and interactive  SKIN: no rash noted, surgical scars well healed  RESPIRATORY: breathing non-labored and no grunting/flaring/retractions  CHEST: equal chest rise with normal respiratory effort  ABDOMEN: soft and non-tender  BACK: back normal in appearance, spine with reduced ROM  EXTREMITIES: strength intact  NEUROLOGIC: gait antalgic, SLR negative, sensation grossly intact.    Assessment and Plan    -Chronicity--chronic spinal pain    -Diagnostics--the patient and I reviewed the imaging that was done at the emergency department including in the chest x-rays and the CT of the abdomen and pelvis both of which showed degenerative changes in the lower thoracic spine.  I would like the patient to have plain films of the thoracic spine.  We may consider a thoracic spine MRI in the future.    -Pharmacologic--no change    -Psychologic--no need for psychologic intervention from my standpoint.  There are no mental health issues of which I am aware that are  contributing to the patient's pain.  There are no substance abuse or alcohol abuse issues of which I am aware that are contributing to the patient's pain.    -Physical--we discussed the importance of physical therapy and exercise.  We discussed avoidance and modification techniques.    -Intervention--the patient request an epidural steroid injection.  The patient is a candidate for thoracic epidural steroid injection at the T11-T12 level.  I explained the risks benefits and alternatives of the procedure to the patient.  The patient wishes to proceed.    I spent time educating the patient on the condition including the treatment and the prognosis.  I invited the patient to call at anytime with any questions.

## 2025-06-18 ENCOUNTER — OFFICE VISIT (OUTPATIENT)
Dept: PRIMARY CARE | Facility: CLINIC | Age: 65
End: 2025-06-18
Payer: COMMERCIAL

## 2025-06-18 VITALS
BODY MASS INDEX: 28.66 KG/M2 | TEMPERATURE: 96.7 F | HEIGHT: 65 IN | OXYGEN SATURATION: 98 % | HEART RATE: 75 BPM | WEIGHT: 172 LBS | DIASTOLIC BLOOD PRESSURE: 80 MMHG | SYSTOLIC BLOOD PRESSURE: 128 MMHG

## 2025-06-18 DIAGNOSIS — I10 ESSENTIAL HYPERTENSION: ICD-10-CM

## 2025-06-18 DIAGNOSIS — R73.9 ELEVATED SERUM GLUCOSE: ICD-10-CM

## 2025-06-18 DIAGNOSIS — E55.9 VITAMIN D DEFICIENCY: ICD-10-CM

## 2025-06-18 DIAGNOSIS — R63.5 WEIGHT GAIN: Primary | ICD-10-CM

## 2025-06-18 DIAGNOSIS — Z00.00 PREVENTATIVE HEALTH CARE: ICD-10-CM

## 2025-06-18 DIAGNOSIS — Z13.6 SCREENING FOR CARDIOVASCULAR CONDITION: ICD-10-CM

## 2025-06-18 DIAGNOSIS — E66.3 OVERWEIGHT (BMI 25.0-29.9): ICD-10-CM

## 2025-06-18 PROCEDURE — 99214 OFFICE O/P EST MOD 30 MIN: CPT | Performed by: STUDENT IN AN ORGANIZED HEALTH CARE EDUCATION/TRAINING PROGRAM

## 2025-06-18 PROCEDURE — 3079F DIAST BP 80-89 MM HG: CPT | Performed by: STUDENT IN AN ORGANIZED HEALTH CARE EDUCATION/TRAINING PROGRAM

## 2025-06-18 PROCEDURE — 1126F AMNT PAIN NOTED NONE PRSNT: CPT | Performed by: STUDENT IN AN ORGANIZED HEALTH CARE EDUCATION/TRAINING PROGRAM

## 2025-06-18 PROCEDURE — 3008F BODY MASS INDEX DOCD: CPT | Performed by: STUDENT IN AN ORGANIZED HEALTH CARE EDUCATION/TRAINING PROGRAM

## 2025-06-18 PROCEDURE — 3074F SYST BP LT 130 MM HG: CPT | Performed by: STUDENT IN AN ORGANIZED HEALTH CARE EDUCATION/TRAINING PROGRAM

## 2025-06-18 ASSESSMENT — PAIN SCALES - GENERAL: PAINLEVEL_OUTOF10: 0-NO PAIN

## 2025-06-18 ASSESSMENT — PATIENT HEALTH QUESTIONNAIRE - PHQ9
SUM OF ALL RESPONSES TO PHQ9 QUESTIONS 1 AND 2: 0
2. FEELING DOWN, DEPRESSED OR HOPELESS: NOT AT ALL
1. LITTLE INTEREST OR PLEASURE IN DOING THINGS: NOT AT ALL

## 2025-06-18 NOTE — PROGRESS NOTES
"Subjective   Alexia Gonzáles is a 65 y.o. female who presents for wants weight loss.    HPI:      This is a 65-year-old female presenting to discuss assistance with weight management.       1/8/2025 1/20/2025 1/31/2025 2/1/2025 2/2/2025 2/3/2025   Vitals         Weight (lb) 160  160.94  171.08       Weight (lb)   169.75       BMI 26.63 kg/m2  26.78 kg/m2  28.47 kg/m2       BMI   28.25 kg/m2          2/26/2025 4/24/2025 5/17/2025 6/9/2025 6/18/2025   Vitals        Weight (lb) 171  177  175  171  172    BMI 28.46 kg/m2  29.45 kg/m2  29.12 kg/m2  28.46 kg/m2  28.62 kg/m2        Scheduled to have epidureal on 7/17/2025 with pain management (Dr. Avina)  A little stiff in the morning, but able to walk.  Has only been about 4 months feels like things are continuing to get better.    Hypertension:  Managed with losartan 50 mg daily.  Patient reports compliance with all blood pressure medications as prescribed.  Denies symptoms of chest pain/pressure/palpitations.  Also without headache or vision changes.      ROS:   Review of systems is essentially negative for all systems except for any identified issues in HPI above.    Objective     /80   Pulse 75   Temp 35.9 °C (96.7 °F)   Ht 1.651 m (5' 5\")   Wt 78 kg (172 lb)   SpO2 98%   BMI 28.62 kg/m²      PHYSICAL EXAM    GENERAL  Well-appearing, pleasant and cooperative.  No acute distress.    HEENT  HEAD:   Normocephalic.  Atraumatic.  EYES:  PERRLA.  No scleral icterus or conjunctival injection.  NECK:  No adenopathy.  No palpable thyroid enlargement or nodules.    THROAT:  Moist oropharynx without tonsillar enlargement or exudates.    LUNGS:    Clear to auscultation bilaterally.  No wheezes, rales, rhonchi.    CARDIAC:  Regular rate and rhythm.  Normal S1S2.  No murmurs/rubs/gallops.    ABDOMEN:  Soft, non-tender, non-distended.  No hepatosplenomegaly.  Normoactive bowel sounds.    MUSCULOSKELETAL:  No gross abnormalities.      EXTREMITIES:  No LE edema or " cyanosis.      NEURO           Alert and oriented x3. No focal deficits.    PSYCH:          Affect appropriate.           Assessment/Plan   Problem List Items Addressed This Visit       Essential hypertension    Well-controlled, asymptomatic.  Continue losartan 50 mg daily.         Vitamin D deficiency    Relevant Orders    Vitamin D 25-Hydroxy,Total (for eval of Vitamin D levels)    Weight gain - Primary    Relevant Orders    TSH with reflex to Free T4 if abnormal    Lipid Panel    Referral to Nutrition Services     Other Visit Diagnoses         Overweight (BMI 25.0-29.9)        Relevant Orders    TSH with reflex to Free T4 if abnormal    Lipid Panel    Hemoglobin A1c    Referral to Nutrition Services      Screening for cardiovascular condition        Relevant Orders    Lipid Panel      Preventative health care        Relevant Orders    TSH with reflex to Free T4 if abnormal    Lipid Panel    CBC    Comprehensive Metabolic Panel      Elevated serum glucose        Relevant Orders    Hemoglobin A1c          Patient Instructions   Thank you for coming to see me today.    Go to the lab for fasting blood work, we will notify you of all results.    Nutrition referral entered today.    Follow-up with me in the coming weeks for first available YEARLY PHYSICAL.  We will review your labs at that time and determine next steps for medical weight management.    Counseling:   Medication education:    Education: The patient is counseled regarding potential side effects of all new medications.    Understanding:  Patient expressed understanding.    Adherence:  No barriers to adherence identified.         Natasha Sigala MD

## 2025-06-18 NOTE — PATIENT INSTRUCTIONS
Thank you for coming to see me today.    Go to the lab for fasting blood work, we will notify you of all results.    Nutrition referral entered today.    Follow-up with me in the coming weeks for first available YEARLY PHYSICAL.  We will review your labs at that time and determine next steps for medical weight management.

## 2025-06-19 ENCOUNTER — APPOINTMENT (OUTPATIENT)
Dept: PRIMARY CARE | Facility: CLINIC | Age: 65
End: 2025-06-19
Payer: COMMERCIAL

## 2025-07-02 ENCOUNTER — HOSPITAL ENCOUNTER (OUTPATIENT)
Dept: RADIOLOGY | Facility: CLINIC | Age: 65
Discharge: HOME | End: 2025-07-02
Payer: COMMERCIAL

## 2025-07-02 ENCOUNTER — APPOINTMENT (OUTPATIENT)
Dept: ORTHOPEDIC SURGERY | Facility: CLINIC | Age: 65
End: 2025-07-02
Payer: COMMERCIAL

## 2025-07-02 DIAGNOSIS — M48.061 DEGENERATIVE LUMBAR SPINAL STENOSIS: ICD-10-CM

## 2025-07-02 PROCEDURE — 72100 X-RAY EXAM L-S SPINE 2/3 VWS: CPT

## 2025-07-02 PROCEDURE — 72100 X-RAY EXAM L-S SPINE 2/3 VWS: CPT | Performed by: RADIOLOGY

## 2025-07-02 PROCEDURE — 1159F MED LIST DOCD IN RCRD: CPT | Performed by: ORTHOPAEDIC SURGERY

## 2025-07-02 PROCEDURE — 99024 POSTOP FOLLOW-UP VISIT: CPT | Performed by: ORTHOPAEDIC SURGERY

## 2025-07-02 ASSESSMENT — PAIN - FUNCTIONAL ASSESSMENT: PAIN_FUNCTIONAL_ASSESSMENT: NO/DENIES PAIN

## 2025-07-02 NOTE — PROGRESS NOTES
Alexia is 6 months from surgery and she has done exceptionally well.  Complete relief of her radicular symptoms.  She is back to full activity.    She has been experiencing mid left-sided thoracic radicular type symptoms.  She is seeing a chiropractor and a pain specialist.  Injections have been recommended.    On exam she looks terrific.  Posture is upright.  Strength intact.    X-rays show maintenance of alignment.    She has continued to do quite well.    She does have symptoms suggestive of left thoracic radiculopathy.  If she is going to have injections, I have suggested that she have a thoracic MRI.  She will check with her pain specialist.  If she is unable to have an MRI, she will contact me and I would order it for her.    Otherwise she will follow-up as needed.

## 2025-07-02 NOTE — LETTER
July 2, 2025     Natasha Sigala MD  [id number] [facility]  [facility] [address]    Patient: Alexia Gonzáles   YOB: 1960   Date of Visit: 7/2/2025       Dear Dr. Natasha Sigala MD:    Thank you for referring Alexia Gonzáles to me for evaluation. Below are my notes for this consultation.  If you have questions, please do not hesitate to call me. I look forward to following your patient along with you.       Sincerely,     Kwaku Gallegos MD      CC: No Recipients  ______________________________________________________________________________________    Alexia is 6 months from surgery and she has done exceptionally well.  Complete relief of her radicular symptoms.  She is back to full activity.    She has been experiencing mid left-sided thoracic radicular type symptoms.  She is seeing a chiropractor and a pain specialist.  Injections have been recommended.    On exam she looks terrific.  Posture is upright.  Strength intact.    X-rays show maintenance of alignment.    She has continued to do quite well.    She does have symptoms suggestive of left thoracic radiculopathy.  If she is going to have injections, I have suggested that she have a thoracic MRI.  She will check with her pain specialist.  If she is unable to have an MRI, she will contact me and I would order it for her.    Otherwise she will follow-up as needed.

## 2025-07-08 DIAGNOSIS — Z12.31 ENCOUNTER FOR SCREENING MAMMOGRAM FOR BREAST CANCER: ICD-10-CM

## 2025-07-10 ENCOUNTER — HOSPITAL ENCOUNTER (OUTPATIENT)
Dept: RADIOLOGY | Facility: HOSPITAL | Age: 65
Discharge: HOME | End: 2025-07-10
Payer: COMMERCIAL

## 2025-07-10 DIAGNOSIS — M54.14 THORACIC RADICULOPATHY: ICD-10-CM

## 2025-07-10 PROCEDURE — 72072 X-RAY EXAM THORAC SPINE 3VWS: CPT | Performed by: STUDENT IN AN ORGANIZED HEALTH CARE EDUCATION/TRAINING PROGRAM

## 2025-07-10 PROCEDURE — 72072 X-RAY EXAM THORAC SPINE 3VWS: CPT

## 2025-07-14 ENCOUNTER — TELEPHONE (OUTPATIENT)
Facility: CLINIC | Age: 65
End: 2025-07-14
Payer: COMMERCIAL

## 2025-07-14 NOTE — TELEPHONE ENCOUNTER
Pt calls stating she is having an injection on 07/17/2025 and has not heard from anyone yet.   Called patient back and left message that someone will call her the day before (07/16/2025) to give her the arrival time.

## 2025-07-17 ENCOUNTER — ANCILLARY PROCEDURE (OUTPATIENT)
Dept: RADIOLOGY | Facility: EXTERNAL LOCATION | Age: 65
End: 2025-07-17
Payer: COMMERCIAL

## 2025-07-17 DIAGNOSIS — M54.14 RADICULOPATHY, THORACIC REGION: ICD-10-CM

## 2025-07-17 PROCEDURE — 62321 NJX INTERLAMINAR CRV/THRC: CPT | Performed by: ANESTHESIOLOGY

## 2025-07-17 NOTE — PROGRESS NOTES
Pre and postprocedure diagnosis--thoracic radiculopathy    Procedure--thoracic epidural steroid injection T11-12    Anesthesia--local and IV sedation    Complications--none    Clinical note--the patient has a history of pain.  I explained the risks, benefits, and alternatives of the procedure to the patient.  The patient wishes to proceed.    Procedure Note--The patient was brought to the procedure room and placed in prone position.  Sterile prep and drape with ChloraPrep and a fenestrated drape.  8 mL of half percent lidocaine were injected through a 25-gauge spinal needle for local anesthesia.  An 18-gauge Touhy needle was guided to the T11-T12 interlaminar epidural space by loss-of-resistance technique under fluoroscopic guidance.  Contrast was injected under live fluoroscopy to ensure proper needle placement.  Then 60 mg of triamcinolone and 2 mL of half percent lidocaine were injected through the needle.  The needle was removed and the patient was transferred to recovery.

## 2025-08-24 DIAGNOSIS — F41.9 ANXIETY AND DEPRESSION: ICD-10-CM

## 2025-08-24 DIAGNOSIS — F32.A ANXIETY AND DEPRESSION: ICD-10-CM

## 2025-08-25 RX ORDER — SERTRALINE HYDROCHLORIDE 50 MG/1
50 TABLET, FILM COATED ORAL DAILY
Qty: 90 TABLET | Refills: 1 | Status: SHIPPED | OUTPATIENT
Start: 2025-08-25

## 2025-09-05 ENCOUNTER — OFFICE VISIT (OUTPATIENT)
Dept: URGENT CARE | Age: 65
End: 2025-09-05
Payer: COMMERCIAL

## 2025-09-05 VITALS
TEMPERATURE: 98 F | DIASTOLIC BLOOD PRESSURE: 80 MMHG | RESPIRATION RATE: 18 BRPM | SYSTOLIC BLOOD PRESSURE: 144 MMHG | HEART RATE: 78 BPM | OXYGEN SATURATION: 95 %

## 2025-09-05 DIAGNOSIS — S83.91XA SPRAIN OF RIGHT KNEE, UNSPECIFIED LIGAMENT, INITIAL ENCOUNTER: Primary | ICD-10-CM

## 2025-09-05 RX ORDER — NAPROXEN 500 MG/1
500 TABLET ORAL 2 TIMES DAILY PRN
Qty: 28 TABLET | Refills: 0 | Status: SHIPPED | OUTPATIENT
Start: 2025-09-05 | End: 2025-09-19

## (undated) DEVICE — SUTURE, VICRYL PLUS, 4-0, 27 IN, PS-2

## (undated) DEVICE — SUTURE, VICRYL PLUS, 2-0, 27IN, PSL, UND, BRAIDED

## (undated) DEVICE — Device

## (undated) DEVICE — TOOL, MR8 BALL, 5MM DIAMETER

## (undated) DEVICE — DRAPE, SHEET, THREE QUARTER, FAN FOLD, 57 X 77 IN

## (undated) DEVICE — DRAIN, WOUND, ROUND, W/TROCAR, HOLE PATTERN, 10 IN, MEDIUM/LARGE, 3/16 X 49 IN

## (undated) DEVICE — DRESSING, GAUZE, 16 PLY, 4 X 4 IN, STERILE

## (undated) DEVICE — SPONGE, NEURO, 1/2 X 1/2IN, STERILE, LF

## (undated) DEVICE — LEGEND, LUB DIFFUSER PACK

## (undated) DEVICE — BONE, MILL, MIDAS REX, DUAL BLADE, ELECTRIC

## (undated) DEVICE — SPHERE, STEALTHSTATION, 5-PK

## (undated) DEVICE — SUTURE, VICRYL, 1, 27 IN, CT-1, VIOLET

## (undated) DEVICE — GLOVE, SURGICAL, PROTEXIS PI BLUE W/NEUTHERA, 8.5, PF, LF

## (undated) DEVICE — TOOL, MR8 9CM BALL, 3MM DIAMETER

## (undated) DEVICE — SUTURE, ETHILON, 2-0, 18 IN, FS, BLACK, BX/12

## (undated) DEVICE — DRESSING, ADAPTIC, NON-ADHERENT, 3 X 8 IN

## (undated) DEVICE — GLOVE, SURGICAL, PROTEXIS PI MICRO, 8.0, PF, LF

## (undated) DEVICE — ELECTRODE, ELECTROSURGICAL, BLADE, NONSTICK, MODIFIED, 6.5 IN X 165 MM

## (undated) DEVICE — GOWN, ASTOUND, XL

## (undated) DEVICE — KIT, PATIENT CARE, JACKSON TABLE W/PRONE-SAFE HEADREST

## (undated) DEVICE — PREP TRAY, VAGINAL

## (undated) DEVICE — EVACUATOR, WOUND, CLOSED, 3 SPRING, 400 CC, Y CONNECTING TUBE

## (undated) DEVICE — ADHESIVE, SKIN, MASTISOL, 2/3 CC VIAL

## (undated) DEVICE — FLOSEAL, MATRIX, HEMOSTATIC, FULL STERILE PREP, 5ML

## (undated) DEVICE — TIP, SUCTION, FRAZIER, W/CONTROL VENT, 12 FR

## (undated) DEVICE — ELECTRODE, ELECTROSURGICAL, BLADE, STANDARD, 2.75 IN

## (undated) DEVICE — DRAPE, INCISE, ANTIMICROBIAL, IOBAN 2, LARGE, 17 X 23 IN, DISPOSABLE, STERILE